# Patient Record
Sex: MALE | Race: WHITE | NOT HISPANIC OR LATINO | Employment: OTHER | URBAN - METROPOLITAN AREA
[De-identification: names, ages, dates, MRNs, and addresses within clinical notes are randomized per-mention and may not be internally consistent; named-entity substitution may affect disease eponyms.]

---

## 2018-03-27 ENCOUNTER — HOSPITAL ENCOUNTER (OUTPATIENT)
Facility: MEDICAL CENTER | Age: 68
End: 2018-03-27
Attending: NEUROLOGICAL SURGERY | Admitting: NEUROLOGICAL SURGERY
Payer: MEDICARE

## 2018-03-29 ENCOUNTER — HOSPITAL ENCOUNTER (OUTPATIENT)
Dept: CARDIOLOGY | Facility: MEDICAL CENTER | Age: 68
End: 2018-03-29
Attending: OPHTHALMOLOGY
Payer: MEDICARE

## 2018-03-29 DIAGNOSIS — H34.11 CENTRAL RETINAL ARTERY OCCLUSION OF RIGHT EYE: ICD-10-CM

## 2018-03-29 LAB
LV EJECT FRACT  99904: 75
LV EJECT FRACT MOD 2C 99903: 77.92
LV EJECT FRACT MOD 4C 99902: 75.36
LV EJECT FRACT MOD BP 99901: 77.08

## 2018-03-29 PROCEDURE — 93306 TTE W/DOPPLER COMPLETE: CPT

## 2018-04-03 ENCOUNTER — HOSPITAL ENCOUNTER (OUTPATIENT)
Dept: RADIOLOGY | Facility: MEDICAL CENTER | Age: 68
End: 2018-04-03
Attending: OPHTHALMOLOGY
Payer: MEDICARE

## 2018-04-03 ENCOUNTER — APPOINTMENT (OUTPATIENT)
Dept: ADMISSIONS | Facility: MEDICAL CENTER | Age: 68
End: 2018-04-03
Payer: MEDICARE

## 2018-04-03 DIAGNOSIS — H34.10 CENTRAL RETINAL ARTERY OCCLUSION, UNSPECIFIED LATERALITY: ICD-10-CM

## 2018-04-03 PROCEDURE — 93880 EXTRACRANIAL BILAT STUDY: CPT

## 2018-04-20 ENCOUNTER — OFFICE VISIT (OUTPATIENT)
Dept: CARDIOLOGY | Facility: MEDICAL CENTER | Age: 68
End: 2018-04-20
Payer: MEDICARE

## 2018-04-20 VITALS
OXYGEN SATURATION: 95 % | HEIGHT: 71 IN | DIASTOLIC BLOOD PRESSURE: 80 MMHG | HEART RATE: 100 BPM | SYSTOLIC BLOOD PRESSURE: 154 MMHG | BODY MASS INDEX: 22.68 KG/M2 | WEIGHT: 162 LBS

## 2018-04-20 DIAGNOSIS — Z95.5 HISTORY OF HEART ARTERY STENT: ICD-10-CM

## 2018-04-20 DIAGNOSIS — I10 HTN (HYPERTENSION), MALIGNANT: ICD-10-CM

## 2018-04-20 DIAGNOSIS — E78.5 DYSLIPIDEMIA: ICD-10-CM

## 2018-04-20 DIAGNOSIS — I77.9 RIGHT-SIDED CAROTID ARTERY DISEASE (HCC): ICD-10-CM

## 2018-04-20 DIAGNOSIS — Z01.810 PREOP CARDIOVASCULAR EXAM: ICD-10-CM

## 2018-04-20 DIAGNOSIS — I25.10 CORONARY ARTERY DISEASE INVOLVING NATIVE CORONARY ARTERY OF NATIVE HEART WITHOUT ANGINA PECTORIS: ICD-10-CM

## 2018-04-20 LAB — EKG IMPRESSION: NORMAL

## 2018-04-20 PROCEDURE — 99203 OFFICE O/P NEW LOW 30 MIN: CPT | Performed by: INTERNAL MEDICINE

## 2018-04-20 PROCEDURE — 93000 ELECTROCARDIOGRAM COMPLETE: CPT | Performed by: INTERNAL MEDICINE

## 2018-04-20 RX ORDER — METOPROLOL SUCCINATE 25 MG/1
50 TABLET, EXTENDED RELEASE ORAL DAILY
COMMUNITY
End: 2018-10-23

## 2018-04-20 ASSESSMENT — ENCOUNTER SYMPTOMS
DOUBLE VISION: 0
COUGH: 0
DEPRESSION: 0
DIZZINESS: 0
LOSS OF CONSCIOUSNESS: 0
FEVER: 0
SENSORY CHANGE: 0
BLOOD IN STOOL: 0
HEADACHES: 0
PND: 0
WEIGHT LOSS: 0
HALLUCINATIONS: 0
FALLS: 0
CLAUDICATION: 0
ORTHOPNEA: 0
SHORTNESS OF BREATH: 0
EYE DISCHARGE: 0
BLURRED VISION: 0
EYE PAIN: 0
VOMITING: 0
SPEECH CHANGE: 0
ABDOMINAL PAIN: 0
NAUSEA: 0
MYALGIAS: 0
BRUISES/BLEEDS EASILY: 0
CHILLS: 0
PALPITATIONS: 0

## 2018-04-20 NOTE — PROGRESS NOTES
Chief Complaint   Patient presents with   • Medical Clearance     NEW PATIENT       Subjective:   Tuan Bernard is a 68 y.o. male who presents today for cardiac care and preop evaluation before his urgent back surgery. Patient's legs are about to give out on him. He has a lot of tingling and numbness sensation in his legs. He does have history of coronary arterial disease for which he underwent PCI and stent placement in his proximal and mid LAD in 2004. He has been doing well since then. No repeated recurrent episodes of heart attacks.    He is being worked up for carotid disease with vascular surgeon.    No chest pain or shortness of breath.    I have reviewed patient's ECG, which shows normal sinus rhythm, normal [place], QT intervals. No evidence of acute coronary syndrome.    Past Medical History:   Diagnosis Date   • CAD (coronary artery disease)    • Heart attack    • Hyperlipidemia    • Hypertension    • PVD (peripheral vascular disease) (CMS-[facility])      Past Surgical History:   Procedure Laterality Date   • ANGIOPLASTY     • CARDIAC CATH       Family History   Problem Relation Age of Onset   • Heart Disease Mother      Social History     Social History   • Marital status:      Spouse name: N/A   • Number of children: N/A   • Years of education: N/A     Occupational History   • Not on file.     Social History Main Topics   • Smoking status: Never Smoker   • Smokeless tobacco: Never Used   • Alcohol use 1.2 oz/week     2 Glasses of wine per week      Comment: occasionally   • Drug use: No   • Sexual activity: Not on file     Other Topics Concern   • Not on file     Social History Narrative   • No narrative on file     No Known Allergies  Outpatient Encounter Prescriptions as of 4/20/2018   Medication Sig Dispense Refill   • metoprolol SR (TOPROL XL) 25 MG TABLET SR 24 HR Take 25 mg by mouth every day.     • aspirin 81 MG tablet Take 81 mg by mouth every day.     • lisinopril (PRINIVIL) 20 MG TABS Take  "20 mg by mouth every day.     • clopidogrel (PLAVIX) 75 MG TABS Take 75 mg by mouth every day.       No facility-administered encounter medications on file as of 4/20/2018.      Review of Systems   Constitutional: Negative for chills, fever, malaise/fatigue and weight loss.   HENT: Negative for ear discharge, ear pain, hearing loss and nosebleeds.    Eyes: Negative for blurred vision, double vision, pain and discharge.   Respiratory: Negative for cough and shortness of breath.    Cardiovascular: Negative for chest pain, palpitations, orthopnea, claudication, leg swelling and PND.   Gastrointestinal: Negative for abdominal pain, blood in stool, melena, nausea and vomiting.   Genitourinary: Negative for dysuria and hematuria.   Musculoskeletal: Negative for falls, joint pain and myalgias.   Skin: Negative for itching and rash.   Neurological: Negative for dizziness, sensory change, speech change, loss of consciousness and headaches.   Endo/Heme/Allergies: Negative for environmental allergies. Does not bruise/bleed easily.   Psychiatric/Behavioral: Negative for depression, hallucinations and suicidal ideas.        Objective:   /80   Pulse 100   Ht 1.803 m (5' 11\")   Wt 73.5 kg (162 lb)   SpO2 95%   BMI 22.59 kg/m²     Physical Exam   Constitutional: He is oriented to person, place, and time. No distress.   HENT:   Head: Normocephalic and atraumatic.   Right Ear: External ear normal.   Left Ear: External ear normal.   Eyes: Right eye exhibits no discharge. Left eye exhibits no discharge.   Neck: No JVD present. No thyromegaly present.   Cardiovascular: Normal rate, regular rhythm, normal heart sounds and intact distal pulses.  Exam reveals no gallop and no friction rub.    No murmur heard.  Pulmonary/Chest: Breath sounds normal. No respiratory distress.   Abdominal: Bowel sounds are normal. He exhibits no distension. There is no tenderness.   Musculoskeletal: He exhibits no edema or tenderness. "   Neurological: He is alert and oriented to person, place, and time. No cranial nerve deficit.   Skin: Skin is warm and dry. He is not diaphoretic.   Psychiatric: He has a normal mood and affect. His behavior is normal.   Nursing note and vitals reviewed.      Assessment:     1. History of heart artery stent prx/mid LAD with NADIRA in 2004  EKG    COMP METABOLIC PANEL    LIPID PANEL   2. HTN (hypertension), malignant     3. Dyslipidemia  COMP METABOLIC PANEL    LIPID PANEL   4. Coronary artery disease involving native coronary artery of native heart without angina pectoris     5. Right-sided carotid artery disease (CMS-HCC)     6. Preop cardiovascular exam         Medical Decision Making:  Today's Assessment / Status / Plan:   Patient is not in acute coronary syndrome presentation, is not having any active TIA or stroke symptoms, as not having decompensated heart failure, is not symptomatic in terms of presyncope pain or syncope. No evidence of significant valvular disease.    I explained to patient that further cardiac testing or procedures will not lower his overall cardiovascular risk for the surgery.  Patient remains low to moderate cardiovascular risk for her intended surgery of back.

## 2018-04-20 NOTE — LETTER
April 20, 2018        Tuan Bernard was seen in my cardiology clinic today.  Patient will have back surgery soon.   Patient is not in acute coronary syndrome presentation, is not having any active TIA or stroke symptoms, as not having decompensated heart failure, is not symptomatic in terms of presyncope pain or syncope. No evidence of significant valvular disease.    I explained to patient that further cardiac testing or procedures will not lower his overall cardiovascular risk for the surgery.  Patient remains low to moderate cardiovascular risk for her intended surgery of back.        Ted West MD.

## 2018-04-20 NOTE — LETTER
[facility]   [address], [facility] [address]  [place] [address]  Phone: [phone]  Fax: [phone]              Tuan Bernard  1950    Encounter Date: 4/20/2018    Ted West M.D.          PROGRESS NOTE:  Chief Complaint   Patient presents with   • Medical Clearance     NEW PATIENT       Subjective:   Tuan Bernard is a 68 y.o. male who presents today for cardiac care and preop evaluation before his urgent back surgery. Patient's legs are about to give out on him. He has a lot of tingling and numbness sensation in his legs. He does have history of coronary arterial disease for which he underwent PCI and stent placement in his proximal and mid LAD in 2004. He has been doing well since then. No repeated recurrent episodes of heart attacks.    He is being worked up for carotid disease with vascular surgeon.    No chest pain or shortness of breath.    I have reviewed patient's ECG, which shows normal sinus rhythm, normal [place], QT intervals. No evidence of acute coronary syndrome.    Past Medical History:   Diagnosis Date   • CAD (coronary artery disease)    • Heart attack    • Hyperlipidemia    • Hypertension    • PVD (peripheral vascular disease) (CMS-[facility])      Past Surgical History:   Procedure Laterality Date   • ANGIOPLASTY     • CARDIAC CATH       Family History   Problem Relation Age of Onset   • Heart Disease Mother      Social History     Social History   • Marital status:      Spouse name: N/A   • Number of children: N/A   • Years of education: N/A     Occupational History   • Not on file.     Social History Main Topics   • Smoking status: Never Smoker   • Smokeless tobacco: Never Used   • Alcohol use 1.2 oz/week     2 Glasses of wine per week      Comment: occasionally   • Drug use: No   • Sexual activity: Not on file     Other Topics Concern   • Not on file     Social History Narrative   • No narrative on file     No Known  "Allergies  Outpatient Encounter Prescriptions as of 4/20/2018   Medication Sig Dispense Refill   • metoprolol SR (TOPROL XL) 25 MG TABLET SR 24 HR Take 25 mg by mouth every day.     • aspirin 81 MG tablet Take 81 mg by mouth every day.     • lisinopril (PRINIVIL) 20 MG TABS Take 20 mg by mouth every day.     • clopidogrel (PLAVIX) 75 MG TABS Take 75 mg by mouth every day.       No facility-administered encounter medications on file as of 4/20/2018.      Review of Systems   Constitutional: Negative for chills, fever, malaise/fatigue and weight loss.   HENT: Negative for ear discharge, ear pain, hearing loss and nosebleeds.    Eyes: Negative for blurred vision, double vision, pain and discharge.   Respiratory: Negative for cough and shortness of breath.    Cardiovascular: Negative for chest pain, palpitations, orthopnea, claudication, leg swelling and PND.   Gastrointestinal: Negative for abdominal pain, blood in stool, melena, nausea and vomiting.   Genitourinary: Negative for dysuria and hematuria.   Musculoskeletal: Negative for falls, joint pain and myalgias.   Skin: Negative for itching and rash.   Neurological: Negative for dizziness, sensory change, speech change, loss of consciousness and headaches.   Endo/Heme/Allergies: Negative for environmental allergies. Does not bruise/bleed easily.   Psychiatric/Behavioral: Negative for depression, hallucinations and suicidal ideas.        Objective:   /80   Pulse 100   Ht 1.803 m (5' 11\")   Wt 73.5 kg (162 lb)   SpO2 95%   BMI 22.59 kg/m²      Physical Exam   Constitutional: He is oriented to person, place, and time. No distress.   HENT:   Head: Normocephalic and atraumatic.   Right Ear: External ear normal.   Left Ear: External ear normal.   Eyes: Right eye exhibits no discharge. Left eye exhibits no discharge.   Neck: No JVD present. No thyromegaly present.   Cardiovascular: Normal rate, regular rhythm, normal heart sounds and intact distal pulses.  Exam " reveals no gallop and no friction rub.    No murmur heard.  Pulmonary/Chest: Breath sounds normal. No respiratory distress.   Abdominal: Bowel sounds are normal. He exhibits no distension. There is no tenderness.   Musculoskeletal: He exhibits no edema or tenderness.   Neurological: He is alert and oriented to person, place, and time. No cranial nerve deficit.   Skin: Skin is warm and dry. He is not diaphoretic.   Psychiatric: He has a normal mood and affect. His behavior is normal.   Nursing note and vitals reviewed.      Assessment:     1. History of heart artery stent prx/mid LAD with NADIRA in 2004  EKG    COMP METABOLIC PANEL    LIPID PANEL   2. HTN (hypertension), malignant     3. Dyslipidemia  COMP METABOLIC PANEL    LIPID PANEL   4. Coronary artery disease involving native coronary artery of native heart without angina pectoris     5. Right-sided carotid artery disease (CMS-HCC)     6. Preop cardiovascular exam         Medical Decision Making:  Today's Assessment / Status / Plan:   Patient is not in acute coronary syndrome presentation, is not having any active TIA or stroke symptoms, as not having decompensated heart failure, is not symptomatic in terms of presyncope pain or syncope. No evidence of significant valvular disease.    I explained to patient that further cardiac testing or procedures will not lower his overall cardiovascular risk for the surgery.  Patient remains low to moderate cardiovascular risk for her intended surgery of back.        Sebastian Marion M.D.  [address] [place] [address]  VIA Facsimile: [phone]

## 2018-05-08 ENCOUNTER — HOSPITAL ENCOUNTER (OUTPATIENT)
Dept: RADIOLOGY | Facility: MEDICAL CENTER | Age: 68
End: 2018-05-08
Attending: SURGERY
Payer: MEDICARE

## 2018-05-08 DIAGNOSIS — I65.23 BILATERAL CAROTID ARTERY STENOSIS: ICD-10-CM

## 2018-05-08 PROCEDURE — 700117 HCHG RX CONTRAST REV CODE 255: Performed by: SURGERY

## 2018-05-08 PROCEDURE — 70496 CT ANGIOGRAPHY HEAD: CPT

## 2018-05-08 PROCEDURE — 70498 CT ANGIOGRAPHY NECK: CPT

## 2018-05-08 RX ADMIN — IOHEXOL 100 ML: 350 INJECTION, SOLUTION INTRAVENOUS at 15:43

## 2018-05-22 ENCOUNTER — APPOINTMENT (OUTPATIENT)
Dept: ADMISSIONS | Facility: MEDICAL CENTER | Age: 68
End: 2018-05-22
Attending: NEUROLOGICAL SURGERY
Payer: MEDICARE

## 2018-05-22 RX ORDER — CHLORAL HYDRATE 500 MG
1000 CAPSULE ORAL DAILY
Status: ON HOLD | COMMUNITY
End: 2018-05-24

## 2018-05-22 RX ORDER — ATORVASTATIN CALCIUM 10 MG/1
20 TABLET, FILM COATED ORAL DAILY
COMMUNITY
End: 2018-10-23

## 2018-05-22 RX ORDER — LISINOPRIL 40 MG/1
40 TABLET ORAL EVERY MORNING
COMMUNITY
End: 2019-04-23 | Stop reason: SDUPTHER

## 2018-05-23 ENCOUNTER — APPOINTMENT (OUTPATIENT)
Dept: RADIOLOGY | Facility: MEDICAL CENTER | Age: 68
End: 2018-05-23
Attending: NEUROLOGICAL SURGERY
Payer: MEDICARE

## 2018-05-23 ENCOUNTER — HOSPITAL ENCOUNTER (OUTPATIENT)
Facility: MEDICAL CENTER | Age: 68
End: 2018-05-24
Attending: NEUROLOGICAL SURGERY | Admitting: NEUROLOGICAL SURGERY
Payer: MEDICARE

## 2018-05-23 DIAGNOSIS — G89.18 POST-OPERATIVE PAIN: ICD-10-CM

## 2018-05-23 PROCEDURE — 160002 HCHG RECOVERY MINUTES (STAT): Performed by: NEUROLOGICAL SURGERY

## 2018-05-23 PROCEDURE — G0378 HOSPITAL OBSERVATION PER HR: HCPCS

## 2018-05-23 PROCEDURE — A9270 NON-COVERED ITEM OR SERVICE: HCPCS

## 2018-05-23 PROCEDURE — 160048 HCHG OR STATISTICAL LEVEL 1-5: Performed by: NEUROLOGICAL SURGERY

## 2018-05-23 PROCEDURE — 160029 HCHG SURGERY MINUTES - 1ST 30 MINS LEVEL 4: Performed by: NEUROLOGICAL SURGERY

## 2018-05-23 PROCEDURE — 700111 HCHG RX REV CODE 636 W/ 250 OVERRIDE (IP)

## 2018-05-23 PROCEDURE — 160036 HCHG PACU - EA ADDL 30 MINS PHASE I: Performed by: NEUROLOGICAL SURGERY

## 2018-05-23 PROCEDURE — 700102 HCHG RX REV CODE 250 W/ 637 OVERRIDE(OP)

## 2018-05-23 PROCEDURE — 501838 HCHG SUTURE GENERAL: Performed by: NEUROLOGICAL SURGERY

## 2018-05-23 PROCEDURE — 700101 HCHG RX REV CODE 250

## 2018-05-23 PROCEDURE — 500367 HCHG DRAIN KIT, HEMOVAC: Performed by: NEUROLOGICAL SURGERY

## 2018-05-23 PROCEDURE — 700111 HCHG RX REV CODE 636 W/ 250 OVERRIDE (IP): Performed by: PHYSICIAN ASSISTANT

## 2018-05-23 PROCEDURE — 503195 HCHG SEALER, BIPOLAR AQUAMANTYS: Performed by: NEUROLOGICAL SURGERY

## 2018-05-23 PROCEDURE — 160035 HCHG PACU - 1ST 60 MINS PHASE I: Performed by: NEUROLOGICAL SURGERY

## 2018-05-23 PROCEDURE — 110454 HCHG SHELL REV 250: Performed by: NEUROLOGICAL SURGERY

## 2018-05-23 PROCEDURE — 700101 HCHG RX REV CODE 250: Performed by: PHYSICIAN ASSISTANT

## 2018-05-23 PROCEDURE — 500002 HCHG ADHESIVE, DERMABOND: Performed by: NEUROLOGICAL SURGERY

## 2018-05-23 PROCEDURE — 160009 HCHG ANES TIME/MIN: Performed by: NEUROLOGICAL SURGERY

## 2018-05-23 PROCEDURE — 160041 HCHG SURGERY MINUTES - EA ADDL 1 MIN LEVEL 4: Performed by: NEUROLOGICAL SURGERY

## 2018-05-23 PROCEDURE — 700102 HCHG RX REV CODE 250 W/ 637 OVERRIDE(OP): Performed by: PHYSICIAN ASSISTANT

## 2018-05-23 PROCEDURE — A9270 NON-COVERED ITEM OR SERVICE: HCPCS | Performed by: PHYSICIAN ASSISTANT

## 2018-05-23 PROCEDURE — 96374 THER/PROPH/DIAG INJ IV PUSH: CPT

## 2018-05-23 RX ORDER — ATORVASTATIN CALCIUM 10 MG/1
10 TABLET, FILM COATED ORAL
Status: DISCONTINUED | OUTPATIENT
Start: 2018-05-23 | End: 2018-05-24 | Stop reason: HOSPADM

## 2018-05-23 RX ORDER — ENEMA 19; 7 G/133ML; G/133ML
1 ENEMA RECTAL
Status: DISCONTINUED | OUTPATIENT
Start: 2018-05-23 | End: 2018-05-24 | Stop reason: HOSPADM

## 2018-05-23 RX ORDER — VANCOMYCIN HCL 900 MCG/MG
POWDER (GRAM) MISCELLANEOUS
Status: DISCONTINUED | OUTPATIENT
Start: 2018-05-23 | End: 2018-05-23 | Stop reason: HOSPADM

## 2018-05-23 RX ORDER — LIDOCAINE HYDROCHLORIDE 10 MG/ML
0.5 INJECTION, SOLUTION INFILTRATION; PERINEURAL
Status: ACTIVE | OUTPATIENT
Start: 2018-05-23 | End: 2018-05-24

## 2018-05-23 RX ORDER — METOPROLOL SUCCINATE 25 MG/1
25 TABLET, EXTENDED RELEASE ORAL DAILY
Status: DISCONTINUED | OUTPATIENT
Start: 2018-05-24 | End: 2018-05-24 | Stop reason: HOSPADM

## 2018-05-23 RX ORDER — LABETALOL HYDROCHLORIDE 5 MG/ML
10 INJECTION, SOLUTION INTRAVENOUS
Status: DISCONTINUED | OUTPATIENT
Start: 2018-05-23 | End: 2018-05-24 | Stop reason: HOSPADM

## 2018-05-23 RX ORDER — MAGNESIUM HYDROXIDE 1200 MG/15ML
LIQUID ORAL
Status: COMPLETED | OUTPATIENT
Start: 2018-05-23 | End: 2018-05-23

## 2018-05-23 RX ORDER — AMOXICILLIN 250 MG
1 CAPSULE ORAL
Status: DISCONTINUED | OUTPATIENT
Start: 2018-05-23 | End: 2018-05-24 | Stop reason: HOSPADM

## 2018-05-23 RX ORDER — POLYETHYLENE GLYCOL 3350 17 G/17G
1 POWDER, FOR SOLUTION ORAL 2 TIMES DAILY PRN
Status: DISCONTINUED | OUTPATIENT
Start: 2018-05-23 | End: 2018-05-24 | Stop reason: HOSPADM

## 2018-05-23 RX ORDER — MORPHINE SULFATE 4 MG/ML
2 INJECTION, SOLUTION INTRAMUSCULAR; INTRAVENOUS
Status: DISCONTINUED | OUTPATIENT
Start: 2018-05-23 | End: 2018-05-24 | Stop reason: HOSPADM

## 2018-05-23 RX ORDER — BUPIVACAINE HYDROCHLORIDE AND EPINEPHRINE 5; 5 MG/ML; UG/ML
INJECTION, SOLUTION EPIDURAL; INTRACAUDAL; PERINEURAL
Status: DISCONTINUED | OUTPATIENT
Start: 2018-05-23 | End: 2018-05-23 | Stop reason: HOSPADM

## 2018-05-23 RX ORDER — AMOXICILLIN 250 MG
1 CAPSULE ORAL NIGHTLY
Status: DISCONTINUED | OUTPATIENT
Start: 2018-05-23 | End: 2018-05-24 | Stop reason: HOSPADM

## 2018-05-23 RX ORDER — DIPHENHYDRAMINE HYDROCHLORIDE 50 MG/ML
25 INJECTION INTRAMUSCULAR; INTRAVENOUS EVERY 6 HOURS PRN
Status: DISCONTINUED | OUTPATIENT
Start: 2018-05-23 | End: 2018-05-24 | Stop reason: HOSPADM

## 2018-05-23 RX ORDER — ALPRAZOLAM 0.25 MG/1
0.25 TABLET ORAL 2 TIMES DAILY PRN
Status: DISCONTINUED | OUTPATIENT
Start: 2018-05-23 | End: 2018-05-24 | Stop reason: HOSPADM

## 2018-05-23 RX ORDER — OXYCODONE HCL 5 MG/5 ML
SOLUTION, ORAL ORAL
Status: COMPLETED
Start: 2018-05-23 | End: 2018-05-23

## 2018-05-23 RX ORDER — BISACODYL 10 MG
10 SUPPOSITORY, RECTAL RECTAL
Status: DISCONTINUED | OUTPATIENT
Start: 2018-05-23 | End: 2018-05-24 | Stop reason: HOSPADM

## 2018-05-23 RX ORDER — HYDROCODONE BITARTRATE AND ACETAMINOPHEN 5; 325 MG/1; MG/1
1-2 TABLET ORAL EVERY 4 HOURS PRN
Status: DISCONTINUED | OUTPATIENT
Start: 2018-05-23 | End: 2018-05-24 | Stop reason: HOSPADM

## 2018-05-23 RX ORDER — ONDANSETRON 4 MG/1
4 TABLET, ORALLY DISINTEGRATING ORAL EVERY 4 HOURS PRN
Status: DISCONTINUED | OUTPATIENT
Start: 2018-05-23 | End: 2018-05-24 | Stop reason: HOSPADM

## 2018-05-23 RX ORDER — DOCUSATE SODIUM 100 MG/1
100 CAPSULE, LIQUID FILLED ORAL 2 TIMES DAILY
Status: DISCONTINUED | OUTPATIENT
Start: 2018-05-23 | End: 2018-05-24 | Stop reason: HOSPADM

## 2018-05-23 RX ORDER — CEFAZOLIN SODIUM 2 G/100ML
2 INJECTION, SOLUTION INTRAVENOUS EVERY 8 HOURS
Status: COMPLETED | OUTPATIENT
Start: 2018-05-23 | End: 2018-05-24

## 2018-05-23 RX ORDER — METHOCARBAMOL 750 MG/1
750 TABLET, FILM COATED ORAL EVERY 8 HOURS PRN
Status: DISCONTINUED | OUTPATIENT
Start: 2018-05-23 | End: 2018-05-24 | Stop reason: HOSPADM

## 2018-05-23 RX ORDER — OXYCODONE HYDROCHLORIDE AND ACETAMINOPHEN 5; 325 MG/1; MG/1
1-2 TABLET ORAL EVERY 4 HOURS PRN
Status: DISCONTINUED | OUTPATIENT
Start: 2018-05-23 | End: 2018-05-24 | Stop reason: HOSPADM

## 2018-05-23 RX ORDER — ONDANSETRON 2 MG/ML
4 INJECTION INTRAMUSCULAR; INTRAVENOUS EVERY 4 HOURS PRN
Status: DISCONTINUED | OUTPATIENT
Start: 2018-05-23 | End: 2018-05-24 | Stop reason: HOSPADM

## 2018-05-23 RX ORDER — LISINOPRIL 20 MG/1
40 TABLET ORAL EVERY MORNING
Status: DISCONTINUED | OUTPATIENT
Start: 2018-05-24 | End: 2018-05-24 | Stop reason: HOSPADM

## 2018-05-23 RX ORDER — SODIUM CHLORIDE AND POTASSIUM CHLORIDE 150; 900 MG/100ML; MG/100ML
INJECTION, SOLUTION INTRAVENOUS CONTINUOUS
Status: DISCONTINUED | OUTPATIENT
Start: 2018-05-23 | End: 2018-05-24 | Stop reason: HOSPADM

## 2018-05-23 RX ORDER — DIPHENHYDRAMINE HCL 25 MG
25 TABLET ORAL EVERY 6 HOURS PRN
Status: DISCONTINUED | OUTPATIENT
Start: 2018-05-23 | End: 2018-05-24 | Stop reason: HOSPADM

## 2018-05-23 RX ORDER — BACITRACIN 50000 [IU]/1
INJECTION, POWDER, FOR SOLUTION INTRAMUSCULAR
Status: DISCONTINUED | OUTPATIENT
Start: 2018-05-23 | End: 2018-05-23 | Stop reason: HOSPADM

## 2018-05-23 RX ORDER — SODIUM CHLORIDE, SODIUM LACTATE, POTASSIUM CHLORIDE, CALCIUM CHLORIDE 600; 310; 30; 20 MG/100ML; MG/100ML; MG/100ML; MG/100ML
INJECTION, SOLUTION INTRAVENOUS CONTINUOUS
Status: DISCONTINUED | OUTPATIENT
Start: 2018-05-23 | End: 2018-05-24 | Stop reason: HOSPADM

## 2018-05-23 RX ADMIN — BENZOCAINE AND MENTHOL 1 LOZENGE: 15; 3.6 LOZENGE ORAL at 22:03

## 2018-05-23 RX ADMIN — OXYCODONE HYDROCHLORIDE AND ACETAMINOPHEN 2 TABLET: 5; 325 TABLET ORAL at 22:18

## 2018-05-23 RX ADMIN — POTASSIUM CHLORIDE AND SODIUM CHLORIDE: 900; 150 INJECTION, SOLUTION INTRAVENOUS at 21:04

## 2018-05-23 RX ADMIN — OXYCODONE HYDROCHLORIDE 5 MG: 5 SOLUTION ORAL at 19:00

## 2018-05-23 RX ADMIN — METHOCARBAMOL 750 MG: 750 TABLET ORAL at 22:18

## 2018-05-23 RX ADMIN — CEFAZOLIN SODIUM 2 G: 2 INJECTION, SOLUTION INTRAVENOUS at 22:17

## 2018-05-23 RX ADMIN — SODIUM CHLORIDE, SODIUM LACTATE, POTASSIUM CHLORIDE, CALCIUM CHLORIDE: 600; 310; 30; 20 INJECTION, SOLUTION INTRAVENOUS at 11:31

## 2018-05-23 RX ADMIN — ATORVASTATIN CALCIUM 10 MG: 10 TABLET, FILM COATED ORAL at 22:01

## 2018-05-23 ASSESSMENT — COGNITIVE AND FUNCTIONAL STATUS - GENERAL
MOBILITY SCORE: 17
STANDING UP FROM CHAIR USING ARMS: A LITTLE
SUGGESTED CMS G CODE MODIFIER MOBILITY: CK
MOVING FROM LYING ON BACK TO SITTING ON SIDE OF FLAT BED: A LITTLE
TURNING FROM BACK TO SIDE WHILE IN FLAT BAD: A LITTLE
DRESSING REGULAR UPPER BODY CLOTHING: A LITTLE
WALKING IN HOSPITAL ROOM: A LITTLE
HELP NEEDED FOR BATHING: A LITTLE
DRESSING REGULAR LOWER BODY CLOTHING: A LITTLE
DAILY ACTIVITIY SCORE: 20
SUGGESTED CMS G CODE MODIFIER DAILY ACTIVITY: CJ
TOILETING: A LITTLE
MOVING TO AND FROM BED TO CHAIR: A LITTLE
CLIMB 3 TO 5 STEPS WITH RAILING: A LOT

## 2018-05-23 ASSESSMENT — PAIN SCALES - GENERAL
PAINLEVEL_OUTOF10: 0
PAINLEVEL_OUTOF10: 3
PAINLEVEL_OUTOF10: 5
PAINLEVEL_OUTOF10: 0
PAINLEVEL_OUTOF10: 3
PAINLEVEL_OUTOF10: 0
PAINLEVEL_OUTOF10: 3
PAINLEVEL_OUTOF10: 7

## 2018-05-23 ASSESSMENT — LIFESTYLE VARIABLES
TOTAL SCORE: 0
EVER HAD A DRINK FIRST THING IN THE MORNING TO STEADY YOUR NERVES TO GET RID OF A HANGOVER: NO
CONSUMPTION TOTAL: NEGATIVE
TOTAL SCORE: 0
ON A TYPICAL DAY WHEN YOU DRINK ALCOHOL HOW MANY DRINKS DO YOU HAVE: 1
TOTAL SCORE: 0
EVER FELT BAD OR GUILTY ABOUT YOUR DRINKING: NO
AVERAGE NUMBER OF DAYS PER WEEK YOU HAVE A DRINK CONTAINING ALCOHOL: 5
ALCOHOL_USE: YES
HAVE YOU EVER FELT YOU SHOULD CUT DOWN ON YOUR DRINKING: NO
HOW MANY TIMES IN THE PAST YEAR HAVE YOU HAD 5 OR MORE DRINKS IN A DAY: 0
HAVE PEOPLE ANNOYED YOU BY CRITICIZING YOUR DRINKING: NO

## 2018-05-23 ASSESSMENT — PATIENT HEALTH QUESTIONNAIRE - PHQ9
SUM OF ALL RESPONSES TO PHQ9 QUESTIONS 1 AND 2: 0
1. LITTLE INTEREST OR PLEASURE IN DOING THINGS: NOT AT ALL
2. FEELING DOWN, DEPRESSED, IRRITABLE, OR HOPELESS: NOT AT ALL

## 2018-05-24 VITALS
DIASTOLIC BLOOD PRESSURE: 74 MMHG | BODY MASS INDEX: 26.11 KG/M2 | SYSTOLIC BLOOD PRESSURE: 146 MMHG | OXYGEN SATURATION: 92 % | RESPIRATION RATE: 14 BRPM | HEIGHT: 71 IN | WEIGHT: 186.51 LBS | TEMPERATURE: 99.4 F | HEART RATE: 94 BPM

## 2018-05-24 LAB
ANION GAP SERPL CALC-SCNC: 6 MMOL/L (ref 0–11.9)
BUN SERPL-MCNC: 19 MG/DL (ref 8–22)
CALCIUM SERPL-MCNC: 8.3 MG/DL (ref 8.5–10.5)
CHLORIDE SERPL-SCNC: 107 MMOL/L (ref 96–112)
CO2 SERPL-SCNC: 23 MMOL/L (ref 20–33)
CREAT SERPL-MCNC: 1.11 MG/DL (ref 0.5–1.4)
ERYTHROCYTE [DISTWIDTH] IN BLOOD BY AUTOMATED COUNT: 43.8 FL (ref 35.9–50)
GLUCOSE SERPL-MCNC: 107 MG/DL (ref 65–99)
HCT VFR BLD AUTO: 34.7 % (ref 42–52)
HGB BLD-MCNC: 11.4 G/DL (ref 14–18)
MCH RBC QN AUTO: 29.6 PG (ref 27–33)
MCHC RBC AUTO-ENTMCNC: 32.9 G/DL (ref 33.7–35.3)
MCV RBC AUTO: 90.1 FL (ref 81.4–97.8)
PLATELET # BLD AUTO: 185 K/UL (ref 164–446)
PMV BLD AUTO: 9.5 FL (ref 9–12.9)
POTASSIUM SERPL-SCNC: 3.9 MMOL/L (ref 3.6–5.5)
RBC # BLD AUTO: 3.85 M/UL (ref 4.7–6.1)
SODIUM SERPL-SCNC: 136 MMOL/L (ref 135–145)
WBC # BLD AUTO: 8.1 K/UL (ref 4.8–10.8)

## 2018-05-24 PROCEDURE — 700102 HCHG RX REV CODE 250 W/ 637 OVERRIDE(OP): Performed by: PHYSICIAN ASSISTANT

## 2018-05-24 PROCEDURE — G8988 SELF CARE GOAL STATUS: HCPCS | Mod: CI

## 2018-05-24 PROCEDURE — 96375 TX/PRO/DX INJ NEW DRUG ADDON: CPT

## 2018-05-24 PROCEDURE — 36415 COLL VENOUS BLD VENIPUNCTURE: CPT

## 2018-05-24 PROCEDURE — G8987 SELF CARE CURRENT STATUS: HCPCS | Mod: CI

## 2018-05-24 PROCEDURE — 96376 TX/PRO/DX INJ SAME DRUG ADON: CPT

## 2018-05-24 PROCEDURE — 700111 HCHG RX REV CODE 636 W/ 250 OVERRIDE (IP): Performed by: PHYSICIAN ASSISTANT

## 2018-05-24 PROCEDURE — G8978 MOBILITY CURRENT STATUS: HCPCS | Mod: CK

## 2018-05-24 PROCEDURE — A9270 NON-COVERED ITEM OR SERVICE: HCPCS | Performed by: PHYSICIAN ASSISTANT

## 2018-05-24 PROCEDURE — 700112 HCHG RX REV CODE 229: Performed by: PHYSICIAN ASSISTANT

## 2018-05-24 PROCEDURE — 85027 COMPLETE CBC AUTOMATED: CPT

## 2018-05-24 PROCEDURE — G0378 HOSPITAL OBSERVATION PER HR: HCPCS

## 2018-05-24 PROCEDURE — 80048 BASIC METABOLIC PNL TOTAL CA: CPT

## 2018-05-24 PROCEDURE — 97165 OT EVAL LOW COMPLEX 30 MIN: CPT

## 2018-05-24 PROCEDURE — G8979 MOBILITY GOAL STATUS: HCPCS | Mod: CI

## 2018-05-24 PROCEDURE — 97162 PT EVAL MOD COMPLEX 30 MIN: CPT

## 2018-05-24 RX ORDER — METHOCARBAMOL 750 MG/1
750 TABLET, FILM COATED ORAL EVERY 8 HOURS PRN
Qty: 90 TAB | Refills: 0 | Status: SHIPPED | OUTPATIENT
Start: 2018-05-24 | End: 2018-10-23

## 2018-05-24 RX ORDER — OXYCODONE HYDROCHLORIDE AND ACETAMINOPHEN 5; 325 MG/1; MG/1
1-2 TABLET ORAL EVERY 4 HOURS PRN
Qty: 60 TAB | Refills: 0 | Status: SHIPPED | OUTPATIENT
Start: 2018-05-24 | End: 2018-06-01

## 2018-05-24 RX ORDER — CEPHALEXIN 500 MG/1
500 CAPSULE ORAL 4 TIMES DAILY
Qty: 20 CAP | Refills: 0 | Status: SHIPPED | OUTPATIENT
Start: 2018-05-24 | End: 2018-10-23

## 2018-05-24 RX ADMIN — ONDANSETRON HYDROCHLORIDE 4 MG: 2 INJECTION, SOLUTION INTRAMUSCULAR; INTRAVENOUS at 08:59

## 2018-05-24 RX ADMIN — OXYCODONE HYDROCHLORIDE AND ACETAMINOPHEN 2 TABLET: 5; 325 TABLET ORAL at 03:31

## 2018-05-24 RX ADMIN — DOCUSATE SODIUM 100 MG: 100 CAPSULE ORAL at 08:49

## 2018-05-24 RX ADMIN — OXYCODONE HYDROCHLORIDE AND ACETAMINOPHEN 2 TABLET: 5; 325 TABLET ORAL at 08:49

## 2018-05-24 RX ADMIN — VITAMIN D, TAB 1000IU (100/BT) 5000 UNITS: 25 TAB at 08:50

## 2018-05-24 RX ADMIN — LISINOPRIL 40 MG: 20 TABLET ORAL at 08:50

## 2018-05-24 RX ADMIN — CEFAZOLIN SODIUM 2 G: 2 INJECTION, SOLUTION INTRAVENOUS at 06:02

## 2018-05-24 ASSESSMENT — COGNITIVE AND FUNCTIONAL STATUS - GENERAL
DAILY ACTIVITIY SCORE: 19
CLIMB 3 TO 5 STEPS WITH RAILING: A LITTLE
TOILETING: A LITTLE
MOBILITY SCORE: 18
DRESSING REGULAR UPPER BODY CLOTHING: A LITTLE
TURNING FROM BACK TO SIDE WHILE IN FLAT BAD: UNABLE
SUGGESTED CMS G CODE MODIFIER DAILY ACTIVITY: CK
PERSONAL GROOMING: A LITTLE
SUGGESTED CMS G CODE MODIFIER MOBILITY: CK
WALKING IN HOSPITAL ROOM: A LITTLE
HELP NEEDED FOR BATHING: A LITTLE
DRESSING REGULAR LOWER BODY CLOTHING: A LITTLE
STANDING UP FROM CHAIR USING ARMS: A LITTLE

## 2018-05-24 ASSESSMENT — PAIN SCALES - GENERAL
PAINLEVEL_OUTOF10: 7
PAINLEVEL_OUTOF10: 6
PAINLEVEL_OUTOF10: 3

## 2018-05-24 ASSESSMENT — GAIT ASSESSMENTS
DEVIATION: STEP TO;DECREASED BASE OF SUPPORT
ASSISTIVE DEVICE: FRONT WHEEL WALKER
GAIT LEVEL OF ASSIST: STAND BY ASSIST
DISTANCE (FEET): 150

## 2018-05-24 ASSESSMENT — ACTIVITIES OF DAILY LIVING (ADL): TOILETING: INDEPENDENT

## 2018-05-24 NOTE — THERAPY
"Occupational Therapy Evaluation completed.   Functional Status:  CGA LB dressing, SPV UB dressing, SPV grooming standing- performed oral care, SBA functional mobility  Plan of Care: Will benefit from Occupational Therapy 3 times per week  Discharge Recommendations:  Equipment: Grab Bars. Post-acute therapy Recommend follow up therapy once at medically appropriate stage in recovery    See \"Rehab Therapy-Acute\" Patient Summary Report for complete documentation.      Pt is a 69 y/o male who presents to acute s/p lumbar laminectomy microdiskectomy of L5-S1. No brace per chart. Pt lives in a 1 story home w/ his spouse and adult daughter availavle for 24/7 SPV. Pt was walk in shower w/ shower chair. Ed/trained pt and family on how to perform BADLs w/ decreased spinal/back pain. Will follow for Acute OT services to increase independence w/ LB dressing and reinforce back safety techniques. Recommend DC home.   "

## 2018-05-24 NOTE — THERAPY
"Physical Therapy Evaluation completed.   Bed Mobility:  Supine to Sit:  (NT, in chair pre/post; discussed sleeping posture)  Transfers: Sit to Stand: Stand by Assist (with FWW)  Gait: Level Of Assist: Stand by Assist with Front-Wheel Walker       Plan of Care: Will benefit from Physical Therapy 3 times per week  Discharge Recommendations: Equipment: No Equipment Needed.     Pt presents with impaired activity tolerance, gait mechanics and right DF ROM/strength s/p L5-S1 lami. Pt is most limited by premorbid gait deficits with steppage gait on right with prior hx of foot drop; pt's right DF about 2+/5 with less great toe extension, discussed relation to pressure on back over time; provided beginning DF program to reduce fall risk. Pt will have 24/7 support from wife, recommend follow up outpt PT as soon as appropriate; will follow if remains in house.    See \"Rehab Therapy-Acute\" Patient Summary Report for complete documentation.     "

## 2018-05-24 NOTE — OP REPORT
DATE OF SERVICE:  05/23/2018    SURGEON:  Betty Haile MD.    ASSISTANT:  Angel Luis Thompson PA-C.    ANESTHESIOLOGIST:  Marielena Mendez MD.    TYPE OF ANESTHESIA:  General anesthesia with endotracheal intubation.    PREOPERATIVE DIAGNOSES:  1.  Status post L2-S1 decompressive laminectomy, 2013.  2.  Complete right foot drop.  3.  Right L5-S1 disk herniation with a superiorly migrated fragment.    POSTOPERATIVE DIAGNOSES:  1.  Status post L2-S1 decompressive laminectomy, 2013.  2.  Complete right foot drop.  3.  Right L5-S1 disk herniation with a superiorly migrated fragment.    INDICATIONS:  The patient is a somewhat complicated 68-year-old man.  He   underwent a decompressive laminectomy back in 2013.  He did well from this.    Everything resolved.  Three months before he same me, he had intermittent leg   symptoms and he developed increasing symptoms, particularly right-sided   buttock pain and right L5 pain.  He also developed a complete right footdrop.    Unfortunately, he could not have his surgery earlier because he had heart   issues and he persisted with pain down the right leg, it was in the L5-S1   distribution.  He had failed all conservative therapy and he was consequently   offered surgery.  Preoperatively, he had a well-healed lumbar incision.  There   was no movement in his right EHL and ankle dorsiflexion.  There was 4/5   weakness of the eversion.  There was a trace hamstrings weakness and some   numbness in the L5-S1 distribution.  MRI scan showed a stable   spondylolisthesis with a large right acute L5-S1 disk herniation with   superiorly migrated fragment.  I explained to him that it was unlikely that   the footdrop will recover, but we will decompress him to see if it would   improve his leg pain.  The risks, benefits, alternatives outlined in the   consultation note.    TITLE OF PROCEDURE:  1.  Redo right L5 hemilaminectomy.  2.  Redo right S1 hemilaminectomy.  3.  Right L5-S1 medial facetectomy.  4.   Right L5-S1 diskectomy.  5.  Transpedicular decompression of the exiting right L5 nerve root.  6.  Microscopic microdissection.  7.  O-arm navigation for bony landmarks.    OPERATIVE FINDINGS:  There was a large amount of scar tissue that was   affected.  There was some lateral recess stenosis.  There were disk fragments,   which we removed in a piecemeal fashion.  There was scar tissue around the L5   and S1 nerve roots.  He was well decompressed at the end.  There was a large   amount of scar tissue mixed with disks.  There were no complications.    ESTIMATED BLOOD LOSS:  50 mL    OPERATIVE DETAILS:  After fully informed consent, the patient was brought to   the operating room at [facility].  General anesthesia was   administered.  He was given intravenous antibiotic.  He was positioned prone   on the OSI operating table in the [place] frame.  All pressure points padded.    Posterior lumbar region prepped and draped in a standard fashion.  We placed a   left iliac spike.  The O-arm was brought in and used for navigation.  Using   the O-arm navigation, I made a 4 cm incision on the right side in the midline   of the L5-S1 interspace.  I dissected down to the deep fascia and then went to   the right side to expose the medial facet joints.  We found where the disk   was ____ superiorly migrated fragment.  I consequently exposed the facet joint   on the right side at L5-S1 and whatever was remaining in the hemilamina.    Self-retaining retractors were placed.  Microscope was brought in the field of   view.  Hemilaminectomy was affected with an AM-8 drill bit.  Once I thinned   this down, I started removing ligamentum flavum.  The exiting nerve root L5   was identified and decompressed in a transpedicular fashion.  I then went more   medially and scraped the scar tissue off the common thecal sac and exposed   the S1 nerve root.  With gentle medial retraction, multiple disk fragments   were removed.   The disk space was incised and removed in a piecemeal fashion.    There were no large single disk fragments.  Foraminotomy was affected.  He   was well decompressed by the end of the case.  At the end of the case, all the   nerve roots were free.  Hemostasis obtained.  The disk space was explored.    There were no loose fragments.  Foraminotomy was affected.  Closure was then   affected using multiple interrupted Vicryl sutures with staples to skin.    COMPLICATIONS:  Nil.    ESTIMATED BLOOD LOSS:  50 mL    I would like to see how the patient progresses.  He had a lot of scar tissue.    He has been well decompressed.  We will see how things progress.       ____________________________________     MD INGRIS SWAN / SHERIE    DD:  05/23/2018 18:05:37  DT:  05/23/2018 19:12:28    D#:  [id number]  Job#:  [id number]    cc: DHEERAJ RAMOS MD, Adam Landon MD

## 2018-05-24 NOTE — PROGRESS NOTES
Neurosurgery Progress Note    Subjective:  POD #1 seen w/ Dr. Haile  Pain controlled w/ orals  Leg symptoms improved  Some unsteadiness in gate  Would like to go home  Mobilizing in room  Voiding  Eating and Drinking   No N/V    Exam:  Wound: Intact. Minor drainage.   Trace distal weakness  Proximally intact  Labs stable  VSS    BP  Min: 125/69  Max: 165/84  Pulse  Av.3  Min: 68  Max: 77  Resp  Av.4  Min: 13  Max: 23  Temp  Av.8 °C (98.2 °F)  Min: 36.3 °C (97.4 °F)  Max: 37.1 °C (98.7 °F)  SpO2  Av.4 %  Min: 92 %  Max: 98 %    No Data Recorded    Recent Labs      18   0253   WBC  8.1   RBC  3.85*   HEMOGLOBIN  11.4*   HEMATOCRIT  34.7*   MCV  90.1   MCH  29.6   MCHC  32.9*   RDW  43.8   PLATELETCT  185   MPV  9.5     Recent Labs      18   0253   SODIUM  136   POTASSIUM  3.9   CHLORIDE  107   CO2  23   GLUCOSE  107*   BUN  19   CREATININE  1.11   CALCIUM  8.3*               Intake/Output       18 0700 - 18 0659 18 0700 - 18 0659      3230-0620 [id number] Total 0878-7784 4010-0399 Total       Intake    P.O.  --  360 360  --  -- --    P.O. -- 360 360 -- -- --    I.V.  1100  200 1300  --  -- --    Crystalloid Intake 3513 885 4113 -- -- --    Other  --  5 5  --  -- --    Medications (P.O./ Enteral Liquids) -- 5 5 -- -- --    Total Intake 2770 134 8864 -- -- --       Output    Urine  --  250 250  --  -- --    Urine Void (mL) (non-catheter) -- 250 250 -- -- --    Blood  110  -- 110  --  -- --    Est. Blood Loss (mL) 110 -- 110 -- -- --    Total Output 110 250 360 -- -- --       Net I/O      -- -- --            Intake/Output Summary (Last 24 hours) at 18 0809  Last data filed at 18 0400   Gross per 24 hour   Intake             1665 ml   Output              360 ml   Net             1305 ml            • lidocaine  0.5 mL Once PRN   • LR   Continuous   • atorvastatin  10 mg QHS   • lisinopril  40 mg QAM   • metoprolol SR  25 mg DAILY   • Pharmacy  Consult Request  1 Each PRN   • MD ALERT...Do not administer NSAIDS or ASPIRIN unless ORDERED By Neurosurgery  1 Each PRN   • docusate sodium  100 mg BID   • senna-docusate  1 Tab Nightly   • senna-docusate  1 Tab Q24HRS PRN   • polyethylene glycol/lytes  1 Packet BID PRN   • magnesium hydroxide  30 mL QDAY PRN   • bisacodyl  10 mg Q24HRS PRN   • fleet  1 Each Once PRN   • 0.9 % NaCl with KCl 20 mEq 1,000 mL   Continuous   • diphenhydrAMINE  25 mg Q6HRS PRN    Or   • diphenhydrAMINE  25 mg Q6HRS PRN   • ondansetron  4 mg Q4HRS PRN   • ondansetron  4 mg Q4HRS PRN   • methocarbamol  750 mg Q8HRS PRN   • ALPRAZolam  0.25 mg BID PRN   • labetalol  10 mg Q HOUR PRN   • benzocaine-menthol  1 Lozenge Q2HRS PRN   • vitamin D  5,000 Units DAILY   • HYDROcodone-acetaminophen  1-2 Tab Q4HRS PRN   • oxyCODONE-acetaminophen  1-2 Tab Q4HRS PRN   • morphine injection  2 mg Q2HRS PRN   • pneumococcal 13-Vidya Conj Vacc  0.5 mL Once       Assessment and Plan:  Hospital day #2  POD #1  Prophylactic anticoagulation: no    Plan:  1. Mobilize with PT/OT  2. He has a FWW at home  3. D/C home at 1100hrs

## 2018-05-24 NOTE — CARE PLAN
Problem: Safety  Goal: Will remain free from injury  Outcome: PROGRESSING AS EXPECTED  Patient side rails up x 2 call light within reach hourly rounding    Problem: Infection  Goal: Will remain free from infection  Outcome: PROGRESSING AS EXPECTED  IV antibiotics cefazolin

## 2018-05-24 NOTE — OR NURSING
Pt A&OX4. VSS. Pt on room air. Afebrile. Lower back dressing CDI. Foot drop to RLE present pre op per Dr. Haile. Pt states he does feel improvement to R toes, he can move them back and forth easier now. Denies numbness/tingling. 4/5 strength to RLE and 5/5 to LLE. Pt states pain to lower back tolerable 3/10 post pain medication administering. Pt has no complaints of nausea, tolerated sips of water. Pt's wife, Hannah, given update and room assignment. Report given to LICO Bolivar. Pt out of PACU via [facility] with transport.

## 2018-05-24 NOTE — DISCHARGE INSTRUCTIONS
Discharge Instructions    Discharged to home by car with relative. Discharged via wheelchair, hospital escort: Yes.  Special equipment needed: Walker    Be sure to schedule a follow-up appointment with your primary care doctor or any specialists as instructed.     Discharge Plan:   Diet Plan: Discussed  Activity Level: Discussed  Confirmed Follow up Appointment: Patient to Call and Schedule Appointment  Confirmed Symptoms Management: Discussed  Medication Reconciliation Updated: Yes  Influenza Vaccine Indication: Not indicated: Previously immunized this influenza season and > 8 years of age    I understand that a diet low in cholesterol, fat, and sodium is recommended for good health. Unless I have been given specific instructions below for another diet, I accept this instruction as my diet prescription.       Special Instructions:     Lumbar Laminectomy, Care After  Refer to this sheet in the next few weeks. These instructions provide you with information on caring for yourself after your procedure. Your health care provider may also give you more specific instructions. Your treatment has been planned according to current medical practices, but problems sometimes occur. Call your health care provider if you have any problems or questions after your procedure.  HOME CARE INSTRUCTIONS   · Check the incision twice a day for signs of infection. Some signs may include a foul-smelling, greenish or yellowish discharge from the wound, increased pain, or increased redness over the incision.  · Change your bandages about 24-36 hours after surgery, or as directed.  · You may shower once the bandage is removed, or as directed. Avoid tub baths, swimming, and hot tubs for 3 weeks or until your incision has healed completely. If you have stitches or staples, they may be removed 2-3 weeks after surgery, or as directed by your doctor.  · Daily exercise is helpful to prevent the return of problems. Walking is permitted. You may use  a treadmill without an incline. Cut down on activities and exercise if you have discomfort. You may also go up and down stairs as much as you can tolerate.  · Do not lift anything heavier than 15 lb. Avoid bending or twisting at the waist. Always bend your knees.  · Maintain strength and range of motion as instructed.  · Do not drive for 2-3 weeks, or as directed by your doctor. You may be a passenger for 20-30 minutes at a time. Lying back in the passenger seat may be more comfortable for you.  · Limit your sitting to intervals of 20-30 minutes. You should lie down or walk in between sitting periods. There are no limitations for sitting in a recliner chair.  · Only take over-the-counter or prescription medicines for pain, discomfort, or fever as directed by your health care provider.  SEEK MEDICAL CARE IF:   · There is increased bleeding (more than a small spot) from the wound.  · You notice redness, swelling, or increasing pain in the wound.  · Pus is coming from the wound.  · You have a fever for more than 2-3 days.  · You notice a foul smell coming from the wound or dressing.  · You have increasing pain in your wound.  SEEK IMMEDIATE MEDICAL CARE IF:   · You develop a rash.  · You have difficulty breathing.  · You have any allergic problems.  · You develop a headache or stiff neck that does not respond to pain relievers.  · You are unable to urinate.  · You develop new onset of pain, numbness, or weakness in the buttocks or lower extremities.  MAKE SURE YOU:   · Understand these instructions.  · Will watch your condition.  · Will get help right away if you are not doing well or get worse.     This information is not intended to replace advice given to you by your health care provider. Make sure you discuss any questions you have with your health care provider.     Document Released: 11/21/2005 Document Revised: 08/20/2014 Document Reviewed: 05/15/2014  Elsevier Interactive Patient Education ©2016 Elsevier  Inc.      · Is patient discharged on Warfarin / Coumadin?   No     Depression / Suicide Risk    As you are discharged from this [facility] facility, it is important to learn how to keep safe from harming yourself.    Recognize the warning signs:  · Abrupt changes in personality, positive or negative- including increase in energy   · Giving away possessions  · Change in eating patterns- significant weight changes-  positive or negative  · Change in sleeping patterns- unable to sleep or sleeping all the time   · Unwillingness or inability to communicate  · Depression  · Unusual sadness, discouragement and loneliness  · Talk of wanting to die  · Neglect of personal appearance   · Rebelliousness- reckless behavior  · Withdrawal from people/activities they love  · Confusion- inability to concentrate     If you or a loved one observes any of these behaviors or has concerns about self-harm, here's what you can do:  · Talk about it- your feelings and reasons for harming yourself  · Remove any means that you might use to hurt yourself (examples: pills, rope, extension cords, firearm)  · Get professional help from the community (Mental Health, Substance Abuse, psychological counseling)  · Do not be alone:Call your Safe Contact- someone whom you trust who will be there for you.  · Call your local CRISIS HOTLINE 021-9202 or 729-285-0822  · Call your local Children's Mobile Crisis Response Team [place] (066) 510-9709 or www.Goji  · Call the toll free National Suicide Prevention Hotlines   · National Suicide Prevention Lifeline 405-457-WZQQ (1928)  · National Hope Line Network 800-SUICIDE (408-5788)

## 2018-05-24 NOTE — PROGRESS NOTES
Pt A&OX4  Ambulated from [place] to bed tolerated well. 2 RN skin check done skin intact except dressing to mid lower back. CDI Denies numbness or tingling. To ALBERTINA

## 2018-05-24 NOTE — OR SURGEON
Immediate Post OP Note    PreOp Diagnosis: Status post lumbar laminectomy with new acute R L5/S1 disc herniation  PostOp Diagnosis: Status post lumbar laminectomy with new acute R L5/S1 disc herniation    Procedure(s):  LUMBAR LAMINECTOMY MICRODISKECTOMY-O-Arm / REDO RIGHT L5-S1 - Wound Class: Clean with Drain    Surgeon(s):  Betty Haile M.D.    Anesthesiologist/Type of Anesthesia:  Anesthesiologist: Joanie Mendez M.D./General    Surgical Staff:  Circulator: Tracie Samaniego R.N.; Josi Harley R.N.  Scrub Person: Harriet Smith Assist: Angel Luis Thompson P.A.-C.  Radiology Technologist: Riana Ruiz  Stealth : Bianka Dempsey R.N.    Assistants: Angel Luis Thompson PA-C,  Specimen: nil    Estimated Blood Loss: 50 cc    Findings: n/a    Complications: nil        5/23/2018 6:17 PM Betty Haile M.D.

## 2018-05-24 NOTE — PROGRESS NOTES
Pt educated on home care instructions, follow up, medications, diet, activity, incision care, bowel care, narcotics, and seeking medical attention. Verbalized understanding. Pt provided prescriptions and educated to fill in [place].

## 2018-05-24 NOTE — DISCHARGE SUMMARY
DATE OF ADMISSION:  05/23/2018    DATE OF DISCHARGE:  05/24/2018    DISCHARGE DIAGNOSES:  1.  Status post redo right L5-S1 hemilaminectomy.  2.  Right L5-S1 medial facetectomy.  3.  Right L5-S1 microdiskectomy.  4.  Status post previous L2-S1 laminectomy in 2013.  5.  Right footdrop.    OPERATION PERFORMED:  See above.    REASON FOR ADMISSION:  Patient is a pleasant 68-year-old male who underwent a   previous lumbar laminectomy in 2013.  He did quite well following this   procedure, but then began to develop some intermittent right leg pain and   weakness.  This persisted despite conservative therapies.  He had MRI imaging   that showed a large right acute L5-S1 disk herniation with superiorly migrated   fragment.  Due to these findings with the symptoms of weakness, he was   offered the above-mentioned surgery and he did consent.    HOSPITAL COURSE:  The patient underwent the above operation without any   complications and was transferred to the neurosurgery floor.  On postoperative   day #1, he was eating, drinking, mobilizing and voiding.  He noted that the   strength in his right leg had improved.  His pain was controlled with oral   analgesia.  At that time, it was determined that he met the criteria for   discharge and was discharged to home in stable condition.    DISCHARGE INSTRUCTIONS:  He is to be extremely cautious with any bending,   flexing, twisting about the low back.  He has followup appointments with our   office in 2 and 6 weeks' time, and he has been instructed to keep these.  He   is to contact our office with any other questions or concerns.  He may shower   in a couple of days, but he is not to submerge the wound until further   instructed.  He is to call our office with any other questions or concerns.    DISCHARGE MEDICATIONS:  1.  Keflex 500 mg.  2.  Percocet 5/325 mg.  3.  Robaxin 750 mg.    I once again have answered all of his questions to the best of my ability.  He   is now again  discharged to home in stable condition.       ____________________________________     JAMIA Hawk    DD:  05/24/2018 08:19:24  DT:  05/24/2018 11:23:27    D#:  [id number]  Job#:  [id number]    cc: DHEERAJ RAMOS MD, Adam Delgado MD , Adam West MD

## 2018-10-19 ENCOUNTER — TELEPHONE (OUTPATIENT)
Dept: CARDIOLOGY | Facility: MEDICAL CENTER | Age: 68
End: 2018-10-19

## 2018-10-19 NOTE — TELEPHONE ENCOUNTER
"S/w patient, he stated that he \"forgot\" to do his labs but will be completing them Monday morning at [facility].       Labs in [place].  "

## 2018-10-23 ENCOUNTER — OFFICE VISIT (OUTPATIENT)
Dept: CARDIOLOGY | Facility: MEDICAL CENTER | Age: 68
End: 2018-10-23
Payer: MEDICARE

## 2018-10-23 VITALS
HEIGHT: 71 IN | OXYGEN SATURATION: 97 % | WEIGHT: 204 LBS | HEART RATE: 88 BPM | BODY MASS INDEX: 28.56 KG/M2 | SYSTOLIC BLOOD PRESSURE: 140 MMHG | DIASTOLIC BLOOD PRESSURE: 74 MMHG

## 2018-10-23 DIAGNOSIS — Z79.899 HIGH RISK MEDICATION USE: ICD-10-CM

## 2018-10-23 DIAGNOSIS — E78.5 DYSLIPIDEMIA: ICD-10-CM

## 2018-10-23 DIAGNOSIS — I65.21 STENOSIS OF RIGHT CAROTID ARTERY: ICD-10-CM

## 2018-10-23 DIAGNOSIS — I25.10 CORONARY ARTERY DISEASE INVOLVING NATIVE CORONARY ARTERY OF NATIVE HEART WITHOUT ANGINA PECTORIS: ICD-10-CM

## 2018-10-23 DIAGNOSIS — Z95.5 HISTORY OF HEART ARTERY STENT: ICD-10-CM

## 2018-10-23 DIAGNOSIS — I10 HTN (HYPERTENSION), MALIGNANT: ICD-10-CM

## 2018-10-23 PROCEDURE — 99213 OFFICE O/P EST LOW 20 MIN: CPT | Performed by: INTERNAL MEDICINE

## 2018-10-23 RX ORDER — METOPROLOL SUCCINATE 100 MG/1
100 TABLET, EXTENDED RELEASE ORAL DAILY
Qty: 90 TAB | Refills: 3 | Status: SHIPPED | OUTPATIENT
Start: 2018-10-23 | End: 2018-12-27

## 2018-10-23 RX ORDER — GABAPENTIN 300 MG/1
CAPSULE ORAL
Refills: 5 | COMMUNITY
Start: 2018-10-06

## 2018-10-23 RX ORDER — PREDNISOLONE ACETATE 10 MG/ML
SUSPENSION/ DROPS OPHTHALMIC
Refills: 0 | COMMUNITY
Start: 2018-09-21 | End: 2018-10-23

## 2018-10-23 RX ORDER — ROSUVASTATIN CALCIUM 40 MG/1
40 TABLET, COATED ORAL DAILY
Qty: 90 TAB | Refills: 3 | Status: SHIPPED | OUTPATIENT
Start: 2018-10-23 | End: 2019-04-23 | Stop reason: SDUPTHER

## 2018-10-23 RX ORDER — CLOPIDOGREL BISULFATE 75 MG/1
75 TABLET ORAL DAILY
Qty: 90 TAB | Refills: 3 | Status: SHIPPED | OUTPATIENT
Start: 2018-10-23 | End: 2019-04-23 | Stop reason: SDUPTHER

## 2018-10-23 ASSESSMENT — ENCOUNTER SYMPTOMS
EYE DISCHARGE: 0
ORTHOPNEA: 0
HALLUCINATIONS: 0
SPEECH CHANGE: 0
DIZZINESS: 0
ABDOMINAL PAIN: 0
HEADACHES: 0
WEIGHT LOSS: 0
DEPRESSION: 0
SHORTNESS OF BREATH: 0
VOMITING: 0
BLURRED VISION: 0
COUGH: 0
SENSORY CHANGE: 0
CHILLS: 0
PND: 0
DOUBLE VISION: 0
PALPITATIONS: 0
EYE PAIN: 0
LOSS OF CONSCIOUSNESS: 0
MYALGIAS: 0
FALLS: 0
BLOOD IN STOOL: 0
FEVER: 0
BRUISES/BLEEDS EASILY: 0
CLAUDICATION: 0
NAUSEA: 0

## 2018-10-23 NOTE — LETTER
"     [facility]   [address]  [place] [address]  Phone: [phone]  Fax: [phone]              Tuan Bernard  1950    Encounter Date: 10/23/2018    Ted West M.D.          PROGRESS NOTE:  Chief Complaint   Patient presents with   • Coronary Artery Disease       Subjective:   Tuan Bernard is a 68 y.o. male who presents today for cardiac care due to prior history of CAD s/p stented coronary artery. He does have history of coronary arterial disease for which he underwent PCI and stent placement in his proximal and mid LAD in 2004. He has been doing well since then. No repeated recurrent episodes of heart attacks.     He is being worked up for carotid disease with vascular surgeon.     No chest pain or shortness of breath.     I have reviewed patient's ECG, which shows normal sinus rhythm, normal HI, QT intervals. No evidence of acute coronary syndrome.    Had back surgery without cardiac problems.    Past Medical History:   Diagnosis Date   • Arthritis 05/22/2018    \"Spine\"   • CAD (coronary artery disease)    • Cataract 05/22/2018    \"Left Eye\"   • H/O heart artery stent    • Heart attack ([facility])     2005   • High cholesterol 05/22/2018   • Hyperlipidemia    • Hypertension 05/22/2018   • Pain 05/22/2018    \"Back Pain\"   • PVD (peripheral vascular disease) ([facility])    • Snoring 05/22/2018    Has not had a sleep study   • Stroke ([facility]) 2017     Pt. denies any residual weakness or problems from stroke     Past Surgical History:   Procedure Laterality Date   • LUMBAR LAMINECTOMY DISKECTOMY Right 5/23/2018    Procedure: LUMBAR LAMINECTOMY MICRODISKECTOMY-O-Arm / REDO RIGHT L5-S1;  Surgeon: Betty Haile M.D.;  Location: SURGERY [facility];  Service: Neurosurgery   • OTHER  2013    \"Back Surgery\"   • OTHER CARDIAC SURGERY  2005    \"Heart Stent\"   • ANGIOPLASTY     • CARDIAC CATH     • CATARACT EXTRACTION WITH IOL      \"Right Eye\"   "     Family History   Problem Relation Age of Onset   • Heart Disease Mother      Social History     Social History   • Marital status:      Spouse name: N/A   • Number of children: N/A   • Years of education: N/A     Occupational History   • Not on file.     Social History Main Topics   • Smoking status: Former Smoker     Types: Cigarettes   • Smokeless tobacco: Never Used      Comment: Quit 2003   • Alcohol use 1.2 oz/week     2 Glasses of wine per week      Comment: 2/day   • Drug use: No   • Sexual activity: Not on file     Other Topics Concern   • Not on file     Social History Narrative   • No narrative on file     No Known Allergies  Outpatient Encounter Prescriptions as of 10/23/2018   Medication Sig Dispense Refill   • gabapentin (NEURONTIN) 300 MG Cap TAKE 2 CAPSULE(S) TWICE A DAY BY ORAL ROUTE FOR 30 DAYS.  5   • rosuvastatin (CRESTOR) 40 MG tablet Take 1 Tab by mouth every day. 90 Tab 3   • metoprolol SR (TOPROL XL) 100 MG TABLET SR 24 HR Take 1 Tab by mouth every day. 90 Tab 3   • clopidogrel (PLAVIX) 75 MG Tab Take 1 Tab by mouth every day. 90 Tab 3   • lisinopril (PRINIVIL, ZESTRIL) 40 MG tablet Take 40 mg by mouth every morning.     • Cholecalciferol (VITAMIN D PO) Take 5,000 Int'l Units by mouth every day.     • [DISCONTINUED] prednisoLONE acetate (PRED FORTE) 1 % Suspension INSTILL 1 DROP TO LEFT EYE TWICE A DAY  0   • [DISCONTINUED] methocarbamol (ROBAXIN) 750 MG Tab Take 1 Tab by mouth every 8 hours as needed. 90 Tab 0   • [DISCONTINUED] cephALEXin (KEFLEX) 500 MG Cap Take 1 Cap by mouth 4 times a day. 20 Cap 0   • [DISCONTINUED] atorvastatin (LIPITOR) 10 MG Tab Take 20 mg by mouth every day.     • [DISCONTINUED] metoprolol SR (TOPROL XL) 25 MG TABLET SR 24 HR Take 50 mg by mouth every day.       No facility-administered encounter medications on file as of 10/23/2018.      Review of Systems   Constitutional: Negative for chills, fever, malaise/fatigue and weight loss.   HENT: Negative for  "ear discharge, ear pain, hearing loss and nosebleeds.    Eyes: Negative for blurred vision, double vision, pain and discharge.   Respiratory: Negative for cough and shortness of breath.    Cardiovascular: Negative for chest pain, palpitations, orthopnea, claudication, leg swelling and PND.   Gastrointestinal: Negative for abdominal pain, blood in stool, melena, nausea and vomiting.   Genitourinary: Negative for dysuria and hematuria.   Musculoskeletal: Negative for falls, joint pain and myalgias.   Skin: Negative for itching and rash.   Neurological: Negative for dizziness, sensory change, speech change, loss of consciousness and headaches.   Endo/Heme/Allergies: Negative for environmental allergies. Does not bruise/bleed easily.   Psychiatric/Behavioral: Negative for depression, hallucinations and suicidal ideas.        Objective:   /74 (BP Location: Left arm, Patient Position: Sitting, BP Cuff Size: Adult)   Pulse 88   Ht 1.803 m (5' 11\")   Wt 92.5 kg (204 lb)   SpO2 97%   BMI 28.45 kg/m²      Physical Exam   Constitutional: He is oriented to person, place, and time. No distress.   HENT:   Head: Normocephalic and atraumatic.   Right Ear: External ear normal.   Left Ear: External ear normal.   Eyes: Right eye exhibits no discharge. Left eye exhibits no discharge.   Neck: No JVD present. No thyromegaly present.   Cardiovascular: Normal rate, regular rhythm, normal heart sounds and intact distal pulses.  Exam reveals no gallop and no friction rub.    No murmur heard.  Pulmonary/Chest: Breath sounds normal. No respiratory distress.   Abdominal: Bowel sounds are normal. He exhibits no distension. There is no tenderness.   Musculoskeletal: He exhibits no edema or tenderness.   Neurological: He is alert and oriented to person, place, and time. No cranial nerve deficit.   Skin: Skin is warm and dry. He is not diaphoretic.   Psychiatric: He has a normal mood and affect. His behavior is normal.   Nursing note and " vitals reviewed.      Assessment:     1. History of heart artery stent prx/mid LAD with NADIRA in 2004  rosuvastatin (CRESTOR) 40 MG tablet    metoprolol SR (TOPROL XL) 100 MG TABLET SR 24 HR    clopidogrel (PLAVIX) 75 MG Tab   2. HTN (hypertension), malignant  metoprolol SR (TOPROL XL) 100 MG TABLET SR 24 HR   3. Dyslipidemia  rosuvastatin (CRESTOR) 40 MG tablet   4. Coronary artery disease involving native coronary artery of native heart without angina pectoris  rosuvastatin (CRESTOR) 40 MG tablet    metoprolol SR (TOPROL XL) 100 MG TABLET SR 24 HR    clopidogrel (PLAVIX) 75 MG Tab   5. Stenosis of right carotid artery  metoprolol SR (TOPROL XL) 100 MG TABLET SR 24 HR    clopidogrel (PLAVIX) 75 MG Tab   6. High risk medication use         Medical Decision Making:  Today's Assessment / Status / Plan:   Blood pressure is slightly high.  Will increase Metoprolol SR (Toprol XL) To 100 mg po daily for better BP control.  Will switch atorvastatin to Rosuvastatin 40 mg po daily due to prior history of coronary stent (CAD).  Continue ASA and Plavix for CAD and PAD.  Continue Lisinopril 40 mg po daily for CAD and BP control.    I will see patient back in clinic with lab tests and studies results in 6 months.    I thank you Dr. Marion for referring patient to our Cardiology Clinic today.          Sebastian Marion M.D.  [address] [place] [id number]  VIA Facsimile: [phone]

## 2018-10-23 NOTE — PROGRESS NOTES
"Chief Complaint   Patient presents with   • Coronary Artery Disease       Subjective:   Tuan Bernard is a 68 y.o. male who presents today for cardiac care due to prior history of CAD s/p stented coronary artery. He does have history of coronary arterial disease for which he underwent PCI and stent placement in his proximal and mid LAD in 2004. He has been doing well since then. No repeated recurrent episodes of heart attacks.     He is being worked up for carotid disease with vascular surgeon.     No chest pain or shortness of breath.     I have reviewed patient's ECG, which shows normal sinus rhythm, normal VA, QT intervals. No evidence of acute coronary syndrome.    Had back surgery without cardiac problems.    Past Medical History:   Diagnosis Date   • Arthritis 05/22/2018    \"Spine\"   • CAD (coronary artery disease)    • Cataract 05/22/2018    \"Left Eye\"   • H/O heart artery stent    • Heart attack ([facility])     2005   • High cholesterol 05/22/2018   • Hyperlipidemia    • Hypertension 05/22/2018   • Pain 05/22/2018    \"Back Pain\"   • PVD (peripheral vascular disease) ([facility])    • Snoring 05/22/2018    Has not had a sleep study   • Stroke ([facility]) 2017     Pt. denies any residual weakness or problems from stroke     Past Surgical History:   Procedure Laterality Date   • LUMBAR LAMINECTOMY DISKECTOMY Right 5/23/2018    Procedure: LUMBAR LAMINECTOMY MICRODISKECTOMY-O-Arm / REDO RIGHT L5-S1;  Surgeon: Betty Haile M.D.;  Location: SURGERY [facility];  Service: Neurosurgery   • OTHER  2013    \"Back Surgery\"   • OTHER CARDIAC SURGERY  2005    \"Heart Stent\"   • ANGIOPLASTY     • CARDIAC CATH     • CATARACT EXTRACTION WITH IOL      \"Right Eye\"     Family History   Problem Relation Age of Onset   • Heart Disease Mother      Social History     Social History   • Marital status:      Spouse name: N/A   • Number of children: N/A   • Years of education: N/A     Occupational History   • Not on file.     Social " History Main Topics   • Smoking status: Former Smoker     Types: Cigarettes   • Smokeless tobacco: Never Used      Comment: Quit 2003   • Alcohol use 1.2 oz/week     2 Glasses of wine per week      Comment: 2/day   • Drug use: No   • Sexual activity: Not on file     Other Topics Concern   • Not on file     Social History Narrative   • No narrative on file     No Known Allergies  Outpatient Encounter Prescriptions as of 10/23/2018   Medication Sig Dispense Refill   • gabapentin (NEURONTIN) 300 MG Cap TAKE 2 CAPSULE(S) TWICE A DAY BY ORAL ROUTE FOR 30 DAYS.  5   • rosuvastatin (CRESTOR) 40 MG tablet Take 1 Tab by mouth every day. 90 Tab 3   • metoprolol SR (TOPROL XL) 100 MG TABLET SR 24 HR Take 1 Tab by mouth every day. 90 Tab 3   • clopidogrel (PLAVIX) 75 MG Tab Take 1 Tab by mouth every day. 90 Tab 3   • lisinopril (PRINIVIL, ZESTRIL) 40 MG tablet Take 40 mg by mouth every morning.     • Cholecalciferol (VITAMIN D PO) Take 5,000 Int'l Units by mouth every day.     • [DISCONTINUED] prednisoLONE acetate (PRED FORTE) 1 % Suspension INSTILL 1 DROP TO LEFT EYE TWICE A DAY  0   • [DISCONTINUED] methocarbamol (ROBAXIN) 750 MG Tab Take 1 Tab by mouth every 8 hours as needed. 90 Tab 0   • [DISCONTINUED] cephALEXin (KEFLEX) 500 MG Cap Take 1 Cap by mouth 4 times a day. 20 Cap 0   • [DISCONTINUED] atorvastatin (LIPITOR) 10 MG Tab Take 20 mg by mouth every day.     • [DISCONTINUED] metoprolol SR (TOPROL XL) 25 MG TABLET SR 24 HR Take 50 mg by mouth every day.       No facility-administered encounter medications on file as of 10/23/2018.      Review of Systems   Constitutional: Negative for chills, fever, malaise/fatigue and weight loss.   HENT: Negative for ear discharge, ear pain, hearing loss and nosebleeds.    Eyes: Negative for blurred vision, double vision, pain and discharge.   Respiratory: Negative for cough and shortness of breath.    Cardiovascular: Negative for chest pain, palpitations, orthopnea, claudication, leg  "swelling and PND.   Gastrointestinal: Negative for abdominal pain, blood in stool, melena, nausea and vomiting.   Genitourinary: Negative for dysuria and hematuria.   Musculoskeletal: Negative for falls, joint pain and myalgias.   Skin: Negative for itching and rash.   Neurological: Negative for dizziness, sensory change, speech change, loss of consciousness and headaches.   Endo/Heme/Allergies: Negative for environmental allergies. Does not bruise/bleed easily.   Psychiatric/Behavioral: Negative for depression, hallucinations and suicidal ideas.        Objective:   /74 (BP Location: Left arm, Patient Position: Sitting, BP Cuff Size: Adult)   Pulse 88   Ht 1.803 m (5' 11\")   Wt 92.5 kg (204 lb)   SpO2 97%   BMI 28.45 kg/m²     Physical Exam   Constitutional: He is oriented to person, place, and time. No distress.   HENT:   Head: Normocephalic and atraumatic.   Right Ear: External ear normal.   Left Ear: External ear normal.   Eyes: Right eye exhibits no discharge. Left eye exhibits no discharge.   Neck: No JVD present. No thyromegaly present.   Cardiovascular: Normal rate, regular rhythm, normal heart sounds and intact distal pulses.  Exam reveals no gallop and no friction rub.    No murmur heard.  Pulmonary/Chest: Breath sounds normal. No respiratory distress.   Abdominal: Bowel sounds are normal. He exhibits no distension. There is no tenderness.   Musculoskeletal: He exhibits no edema or tenderness.   Neurological: He is alert and oriented to person, place, and time. No cranial nerve deficit.   Skin: Skin is warm and dry. He is not diaphoretic.   Psychiatric: He has a normal mood and affect. His behavior is normal.   Nursing note and vitals reviewed.      Assessment:     1. History of heart artery stent prx/mid LAD with NADIRA in 2004  rosuvastatin (CRESTOR) 40 MG tablet    metoprolol SR (TOPROL XL) 100 MG TABLET SR 24 HR    clopidogrel (PLAVIX) 75 MG Tab   2. HTN (hypertension), malignant  metoprolol SR " (TOPROL XL) 100 MG TABLET SR 24 HR   3. Dyslipidemia  rosuvastatin (CRESTOR) 40 MG tablet   4. Coronary artery disease involving native coronary artery of native heart without angina pectoris  rosuvastatin (CRESTOR) 40 MG tablet    metoprolol SR (TOPROL XL) 100 MG TABLET SR 24 HR    clopidogrel (PLAVIX) 75 MG Tab   5. Stenosis of right carotid artery  metoprolol SR (TOPROL XL) 100 MG TABLET SR 24 HR    clopidogrel (PLAVIX) 75 MG Tab   6. High risk medication use         Medical Decision Making:  Today's Assessment / Status / Plan:   Blood pressure is slightly high.  Will increase Metoprolol SR (Toprol XL) To 100 mg po daily for better BP control.  Will switch atorvastatin to Rosuvastatin 40 mg po daily due to prior history of coronary stent (CAD).  Continue ASA and Plavix for CAD and PAD.  Continue Lisinopril 40 mg po daily for CAD and BP control.    I will see patient back in clinic with lab tests and studies results in 6 months.    I thank you Dr. Marion for referring patient to our Cardiology Clinic today.

## 2018-12-27 ENCOUNTER — TELEPHONE (OUTPATIENT)
Dept: CARDIOLOGY | Facility: MEDICAL CENTER | Age: 68
End: 2018-12-27

## 2018-12-27 RX ORDER — METOPROLOL SUCCINATE 50 MG/1
50 TABLET, EXTENDED RELEASE ORAL DAILY
Qty: 90 TAB | Refills: 1 | Status: SHIPPED | OUTPATIENT
Start: 2018-12-27 | End: 2019-04-23 | Stop reason: SDUPTHER

## 2018-12-27 NOTE — TELEPHONE ENCOUNTER
RE: new RX   Received: Today   Message Contents   DECLAN Perera R.N.             Toprol XL 50 mg Daily.     TT    Previous Messages      ----- Message -----   From: Aide No R.N.   Sent: 12/27/2018   1:22 PM   To: Ted West M.D.   Subject: FW: new RX                                           ----- Message -----   From: Mari Clark, Med Ass't   Sent: 12/27/2018   1:19 PM   To: Aide No R.N.   Subject: new RX                                           Patient reached out to his pharmacist concerned about his dose for:   metoprolol SR (TOPROL XL) 100 MG TABLET SR 24 HR     He would like a lower dose and states that the 100mg might be too high for him.     The pharmacy is waiting for a new RX         New Rx routed to pharmacy.

## 2019-01-14 ENCOUNTER — HOSPITAL ENCOUNTER (OUTPATIENT)
Dept: RADIOLOGY | Facility: MEDICAL CENTER | Age: 69
End: 2019-01-14
Attending: SURGERY
Payer: MEDICARE

## 2019-01-14 DIAGNOSIS — I65.21 OCCLUSION OF RIGHT CAROTID ARTERY: ICD-10-CM

## 2019-01-14 PROCEDURE — 93880 EXTRACRANIAL BILAT STUDY: CPT

## 2019-04-18 ENCOUNTER — TELEPHONE (OUTPATIENT)
Dept: CARDIOLOGY | Facility: MEDICAL CENTER | Age: 69
End: 2019-04-18

## 2019-04-18 NOTE — TELEPHONE ENCOUNTER
NARENDRA to remind patient to complete fasting labs before upcoming appt with Dr. West on 04/23/2019.

## 2019-04-23 ENCOUNTER — OFFICE VISIT (OUTPATIENT)
Dept: CARDIOLOGY | Facility: MEDICAL CENTER | Age: 69
End: 2019-04-23
Payer: MEDICARE

## 2019-04-23 ENCOUNTER — TELEPHONE (OUTPATIENT)
Dept: CARDIOLOGY | Facility: MEDICAL CENTER | Age: 69
End: 2019-04-23

## 2019-04-23 VITALS
OXYGEN SATURATION: 98 % | HEART RATE: 72 BPM | WEIGHT: 208 LBS | DIASTOLIC BLOOD PRESSURE: 90 MMHG | BODY MASS INDEX: 29.12 KG/M2 | HEIGHT: 71 IN | SYSTOLIC BLOOD PRESSURE: 152 MMHG

## 2019-04-23 DIAGNOSIS — I10 HTN (HYPERTENSION), MALIGNANT: ICD-10-CM

## 2019-04-23 DIAGNOSIS — I25.10 CORONARY ARTERY DISEASE INVOLVING NATIVE CORONARY ARTERY OF NATIVE HEART WITHOUT ANGINA PECTORIS: ICD-10-CM

## 2019-04-23 DIAGNOSIS — Z95.5 HISTORY OF HEART ARTERY STENT: ICD-10-CM

## 2019-04-23 DIAGNOSIS — I65.21 STENOSIS OF RIGHT CAROTID ARTERY: ICD-10-CM

## 2019-04-23 DIAGNOSIS — Z01.810 PREOP CARDIOVASCULAR EXAM: ICD-10-CM

## 2019-04-23 DIAGNOSIS — E78.5 DYSLIPIDEMIA: ICD-10-CM

## 2019-04-23 DIAGNOSIS — E78.2 MIXED HYPERLIPIDEMIA: ICD-10-CM

## 2019-04-23 DIAGNOSIS — Z79.899 HIGH RISK MEDICATION USE: ICD-10-CM

## 2019-04-23 LAB — EKG IMPRESSION: NORMAL

## 2019-04-23 PROCEDURE — 99214 OFFICE O/P EST MOD 30 MIN: CPT | Performed by: INTERNAL MEDICINE

## 2019-04-23 PROCEDURE — 93000 ELECTROCARDIOGRAM COMPLETE: CPT | Performed by: INTERNAL MEDICINE

## 2019-04-23 RX ORDER — CLOPIDOGREL BISULFATE 75 MG/1
75 TABLET ORAL DAILY
Qty: 90 TAB | Refills: 3 | Status: SHIPPED | OUTPATIENT
Start: 2019-04-23

## 2019-04-23 RX ORDER — METOPROLOL SUCCINATE 50 MG/1
50 TABLET, EXTENDED RELEASE ORAL 2 TIMES DAILY
Qty: 180 TAB | Refills: 3 | Status: SHIPPED | OUTPATIENT
Start: 2019-04-23 | End: 2019-04-23 | Stop reason: SDUPTHER

## 2019-04-23 RX ORDER — ROSUVASTATIN CALCIUM 40 MG/1
40 TABLET, COATED ORAL DAILY
Qty: 90 TAB | Refills: 3 | Status: SHIPPED | OUTPATIENT
Start: 2019-04-23

## 2019-04-23 RX ORDER — CLOPIDOGREL BISULFATE 75 MG/1
75 TABLET ORAL DAILY
Qty: 90 TAB | Refills: 3 | Status: SHIPPED | OUTPATIENT
Start: 2019-04-23 | End: 2019-04-23 | Stop reason: SDUPTHER

## 2019-04-23 RX ORDER — LISINOPRIL 40 MG/1
TABLET ORAL
COMMUNITY
End: 2019-04-23

## 2019-04-23 RX ORDER — ROSUVASTATIN CALCIUM 40 MG/1
40 TABLET, COATED ORAL DAILY
Qty: 90 TAB | Refills: 3 | Status: SHIPPED | OUTPATIENT
Start: 2019-04-23 | End: 2019-04-23 | Stop reason: SDUPTHER

## 2019-04-23 RX ORDER — LISINOPRIL 40 MG/1
40 TABLET ORAL EVERY MORNING
Qty: 90 TAB | Refills: 3 | Status: SHIPPED | OUTPATIENT
Start: 2019-04-23 | End: 2019-04-23 | Stop reason: SDUPTHER

## 2019-04-23 RX ORDER — METOPROLOL SUCCINATE 50 MG/1
50 TABLET, EXTENDED RELEASE ORAL 2 TIMES DAILY
Qty: 180 TAB | Refills: 3 | Status: SHIPPED | OUTPATIENT
Start: 2019-04-23

## 2019-04-23 RX ORDER — LISINOPRIL 40 MG/1
40 TABLET ORAL EVERY MORNING
Qty: 90 TAB | Refills: 3 | Status: SHIPPED | OUTPATIENT
Start: 2019-04-23

## 2019-04-23 ASSESSMENT — ENCOUNTER SYMPTOMS
ABDOMINAL PAIN: 0
BRUISES/BLEEDS EASILY: 0
SENSORY CHANGE: 0
PND: 0
COUGH: 0
FALLS: 0
DEPRESSION: 0
CLAUDICATION: 0
EYE DISCHARGE: 0
HEADACHES: 0
HALLUCINATIONS: 0
EYE PAIN: 0
NAUSEA: 0
MYALGIAS: 0
ORTHOPNEA: 0
FEVER: 0
DIZZINESS: 0
CHILLS: 0
PALPITATIONS: 0
DOUBLE VISION: 0
WEIGHT LOSS: 0
SHORTNESS OF BREATH: 0
BLURRED VISION: 0
LOSS OF CONSCIOUSNESS: 0
SPEECH CHANGE: 0
BLOOD IN STOOL: 0
VOMITING: 0

## 2019-04-23 NOTE — TELEPHONE ENCOUNTER
pharmacy change   Received: Today   Message Contents   Amrita Resendiz, Rafael Ass't  Aide No, RNATALIA.   Phone Number: [phone]             Aide/MARQUITA     At checkout the patient mentioned he forgot to tell TT that he wanted to change pharmacies the the [facility] pharmacy on [address] [facility] on   [address].     metoprolol SR (TOPROL XL) 50 MG TABLET SR 24 HR   clopidogrel (PLAVIX) 75 MG Tab   rosuvastatin (CRESTOR) 40 MG tablet   lisinopril (PRINIVIL, ZESTRIL) 40 MG tablet     Any questions please call pt.  Thank you        Rxs re-routed to above requested pharmacy.

## 2019-04-23 NOTE — PROGRESS NOTES
"Chief Complaint   Patient presents with   • Hypertension       Subjective:   Tuan Bernard is a 69 y.o. male who presents today for cardiac care and preop evaluation before his urgent back surgery. Patient's legs are about to give out on him. He has a lot of tingling and numbness sensation in his legs. He does have history of coronary arterial disease for which he underwent PCI and stent placement in his proximal and mid LAD in 2004. He has been doing well since then. No repeated recurrent episodes of heart attacks.     He is being worked up for carotid disease with vascular surgeon.     No chest pain or shortness of breath.     I have reviewed patient's ECG, which shows normal sinus rhythm, normal MT, QT intervals. No evidence of acute coronary syndrome.    Past Medical History:   Diagnosis Date   • Arthritis 05/22/2018    \"Spine\"   • CAD (coronary artery disease)    • Cataract 05/22/2018    \"Left Eye\"   • H/O heart artery stent    • Heart attack ([facility])     2005   • High cholesterol 05/22/2018   • Hyperlipidemia    • Hypertension 05/22/2018   • Pain 05/22/2018    \"Back Pain\"   • PVD (peripheral vascular disease) (Former[facility])    • Snoring 05/22/2018    Has not had a sleep study   • Stroke ([facility]) 2017     Pt. denies any residual weakness or problems from stroke     Past Surgical History:   Procedure Laterality Date   • LUMBAR LAMINECTOMY DISKECTOMY Right 5/23/2018    Procedure: LUMBAR LAMINECTOMY MICRODISKECTOMY-O-Arm / REDO RIGHT L5-S1;  Surgeon: Betty Haile M.D.;  Location: SURGERY [facility];  Service: Neurosurgery   • OTHER  2013    \"Back Surgery\"   • OTHER CARDIAC SURGERY  2005    \"Heart Stent\"   • ANGIOPLASTY     • CATARACT EXTRACTION WITH IOL      \"Right Eye\"   • ZZZ CARDIAC CATH       Family History   Problem Relation Age of Onset   • Heart Disease Mother      Social History     Social History   • Marital status:      Spouse name: N/A   • Number of children: N/A   • Years of education: N/A "     Occupational History   • Not on file.     Social History Main Topics   • Smoking status: Former Smoker     Types: Cigarettes   • Smokeless tobacco: Never Used      Comment: Quit 2003   • Alcohol use 1.2 oz/week     2 Glasses of wine per week      Comment: 2/day   • Drug use: No   • Sexual activity: Not on file     Other Topics Concern   • Not on file     Social History Narrative   • No narrative on file     No Known Allergies  Outpatient Encounter Prescriptions as of 4/23/2019   Medication Sig Dispense Refill   • lisinopril (PRINIVIL, ZESTRIL) 40 MG tablet lisinopril 40 mg tablet   Take 1 tablet every day by oral route.     • lisinopril (PRINIVIL, ZESTRIL) 40 MG tablet Take 1 Tab by mouth every morning. 90 Tab 3   • rosuvastatin (CRESTOR) 40 MG tablet Take 1 Tab by mouth every day. 90 Tab 3   • clopidogrel (PLAVIX) 75 MG Tab Take 1 Tab by mouth every day. 90 Tab 3   • metoprolol SR (TOPROL XL) 50 MG TABLET SR 24 HR Take 1 Tab by mouth 2 Times a Day. 180 Tab 3   • Cholecalciferol (VITAMIN D PO) Take 5,000 Int'l Units by mouth every day.     • [DISCONTINUED] metoprolol SR (TOPROL XL) 50 MG TABLET SR 24 HR Take 1 Tab by mouth every day. 90 Tab 1   • gabapentin (NEURONTIN) 300 MG Cap TAKE 2 CAPSULE(S) TWICE A DAY BY ORAL ROUTE FOR 30 DAYS.  5   • [DISCONTINUED] rosuvastatin (CRESTOR) 40 MG tablet Take 1 Tab by mouth every day. 90 Tab 3   • [DISCONTINUED] clopidogrel (PLAVIX) 75 MG Tab Take 1 Tab by mouth every day. 90 Tab 3   • [DISCONTINUED] lisinopril (PRINIVIL, ZESTRIL) 40 MG tablet Take 40 mg by mouth every morning.       No facility-administered encounter medications on file as of 4/23/2019.      Review of Systems   Constitutional: Negative for chills, fever, malaise/fatigue and weight loss.   HENT: Negative for ear discharge, ear pain, hearing loss and nosebleeds.    Eyes: Negative for blurred vision, double vision, pain and discharge.   Respiratory: Negative for cough and shortness of breath.   "  Cardiovascular: Negative for chest pain, palpitations, orthopnea, claudication, leg swelling and PND.   Gastrointestinal: Negative for abdominal pain, blood in stool, melena, nausea and vomiting.   Genitourinary: Negative for dysuria and hematuria.   Musculoskeletal: Negative for falls, joint pain and myalgias.   Skin: Negative for itching and rash.   Neurological: Negative for dizziness, sensory change, speech change, loss of consciousness and headaches.   Endo/Heme/Allergies: Negative for environmental allergies. Does not bruise/bleed easily.   Psychiatric/Behavioral: Negative for depression, hallucinations and suicidal ideas.        Objective:   /90 (BP Location: Left arm, Patient Position: Sitting, BP Cuff Size: Adult)   Pulse 72   Ht 1.803 m (5' 11\")   Wt 94.3 kg (208 lb)   SpO2 98%   BMI 29.01 kg/m²     Physical Exam   Constitutional: He is oriented to person, place, and time. No distress.   HENT:   Head: Normocephalic and atraumatic.   Right Ear: External ear normal.   Left Ear: External ear normal.   Eyes: Right eye exhibits no discharge. Left eye exhibits no discharge.   Neck: No JVD present. No thyromegaly present.   Cardiovascular: Normal rate, regular rhythm, normal heart sounds and intact distal pulses.  Exam reveals no gallop and no friction rub.    No murmur heard.  Pulmonary/Chest: Breath sounds normal. No respiratory distress.   Abdominal: Bowel sounds are normal. He exhibits no distension. There is no tenderness.   Musculoskeletal: He exhibits no edema or tenderness.   Neurological: He is alert and oriented to person, place, and time. No cranial nerve deficit.   Skin: Skin is warm and dry. He is not diaphoretic.   Psychiatric: He has a normal mood and affect. His behavior is normal.   Nursing note and vitals reviewed.      Assessment:     1. History of heart artery stent prx/mid LAD with NADIRA in 2004  rosuvastatin (CRESTOR) 40 MG tablet    clopidogrel (PLAVIX) 75 MG Tab    EKG   2. HTN " (hypertension), malignant  lisinopril (PRINIVIL, ZESTRIL) 40 MG tablet    metoprolol SR (TOPROL XL) 50 MG TABLET SR 24 HR    EKG   3. Mixed hyperlipidemia     4. Coronary artery disease involving native coronary artery of native heart without angina pectoris  rosuvastatin (CRESTOR) 40 MG tablet    clopidogrel (PLAVIX) 75 MG Tab    EKG   5. Stenosis of right carotid artery     6. High risk medication use     7. Preop cardiovascular exam  EKG   8. Dyslipidemia         Medical Decision Making:  Today's Assessment / Status / Plan:   Blood pressure is high. Will increase Toprol XL to 50 mg bid. Continue Lisinopril 40 mg po daily.    For his stented coronary artery and CAD, will continue Rosuvastatin, ASA, Plavix.    Patient remains low to moderate cardiovascular risk for her intended surgery of foot.    I will see patient back in clinic with lab tests and studies results in 6 months.    I thank you Dr. Marion for referring patient to our Cardiology Clinic today.

## 2019-04-23 NOTE — LETTER
"     [facility]   [address], [address]  [place] [address]  Phone: [phone]  Fax: [phone]              Tuan Bernard  1950    Encounter Date: 4/23/2019    Ted West M.D.          PROGRESS NOTE:  Chief Complaint   Patient presents with   • Hypertension       Subjective:   Tuan Bernard is a 69 y.o. male who presents today for cardiac care and preop evaluation before his urgent back surgery. Patient's legs are about to give out on him. He has a lot of tingling and numbness sensation in his legs. He does have history of coronary arterial disease for which he underwent PCI and stent placement in his proximal and mid LAD in 2004. He has been doing well since then. No repeated recurrent episodes of heart attacks.     He is being worked up for carotid disease with vascular surgeon.     No chest pain or shortness of breath.     I have reviewed patient's ECG, which shows normal sinus rhythm, normal MS, QT intervals. No evidence of acute coronary syndrome.    Past Medical History:   Diagnosis Date   • Arthritis 05/22/2018    \"Spine\"   • CAD (coronary artery disease)    • Cataract 05/22/2018    \"Left Eye\"   • H/O heart artery stent    • Heart attack ([facility])     2005   • High cholesterol 05/22/2018   • Hyperlipidemia    • Hypertension 05/22/2018   • Pain 05/22/2018    \"Back Pain\"   • PVD (peripheral vascular disease) ([facility])    • Snoring 05/22/2018    Has not had a sleep study   • Stroke ([facility]) 2017     Pt. denies any residual weakness or problems from stroke     Past Surgical History:   Procedure Laterality Date   • LUMBAR LAMINECTOMY DISKECTOMY Right 5/23/2018    Procedure: LUMBAR LAMINECTOMY MICRODISKECTOMY-O-Arm / REDO RIGHT L5-S1;  Surgeon: Betty Haile M.D.;  Location: SURGERY [facility];  Service: Neurosurgery   • OTHER  2013    \"Back Surgery\"   • OTHER CARDIAC SURGERY  2005    \"Heart Stent\"   • ANGIOPLASTY     • CATARACT EXTRACTION " "WITH IOL      \"Right Eye\"   • ZZZ CARDIAC CATH       Family History   Problem Relation Age of Onset   • Heart Disease Mother      Social History     Social History   • Marital status:      Spouse name: N/A   • Number of children: N/A   • Years of education: N/A     Occupational History   • Not on file.     Social History Main Topics   • Smoking status: Former Smoker     Types: Cigarettes   • Smokeless tobacco: Never Used      Comment: Quit 2003   • Alcohol use 1.2 oz/week     2 Glasses of wine per week      Comment: 2/day   • Drug use: No   • Sexual activity: Not on file     Other Topics Concern   • Not on file     Social History Narrative   • No narrative on file     No Known Allergies  Outpatient Encounter Prescriptions as of 4/23/2019   Medication Sig Dispense Refill   • lisinopril (PRINIVIL, ZESTRIL) 40 MG tablet lisinopril 40 mg tablet   Take 1 tablet every day by oral route.     • lisinopril (PRINIVIL, ZESTRIL) 40 MG tablet Take 1 Tab by mouth every morning. 90 Tab 3   • rosuvastatin (CRESTOR) 40 MG tablet Take 1 Tab by mouth every day. 90 Tab 3   • clopidogrel (PLAVIX) 75 MG Tab Take 1 Tab by mouth every day. 90 Tab 3   • metoprolol SR (TOPROL XL) 50 MG TABLET SR 24 HR Take 1 Tab by mouth 2 Times a Day. 180 Tab 3   • Cholecalciferol (VITAMIN D PO) Take 5,000 Int'l Units by mouth every day.     • [DISCONTINUED] metoprolol SR (TOPROL XL) 50 MG TABLET SR 24 HR Take 1 Tab by mouth every day. 90 Tab 1   • gabapentin (NEURONTIN) 300 MG Cap TAKE 2 CAPSULE(S) TWICE A DAY BY ORAL ROUTE FOR 30 DAYS.  5   • [DISCONTINUED] rosuvastatin (CRESTOR) 40 MG tablet Take 1 Tab by mouth every day. 90 Tab 3   • [DISCONTINUED] clopidogrel (PLAVIX) 75 MG Tab Take 1 Tab by mouth every day. 90 Tab 3   • [DISCONTINUED] lisinopril (PRINIVIL, ZESTRIL) 40 MG tablet Take 40 mg by mouth every morning.       No facility-administered encounter medications on file as of 4/23/2019.      Review of Systems   Constitutional: Negative for " "chills, fever, malaise/fatigue and weight loss.   HENT: Negative for ear discharge, ear pain, hearing loss and nosebleeds.    Eyes: Negative for blurred vision, double vision, pain and discharge.   Respiratory: Negative for cough and shortness of breath.    Cardiovascular: Negative for chest pain, palpitations, orthopnea, claudication, leg swelling and PND.   Gastrointestinal: Negative for abdominal pain, blood in stool, melena, nausea and vomiting.   Genitourinary: Negative for dysuria and hematuria.   Musculoskeletal: Negative for falls, joint pain and myalgias.   Skin: Negative for itching and rash.   Neurological: Negative for dizziness, sensory change, speech change, loss of consciousness and headaches.   Endo/Heme/Allergies: Negative for environmental allergies. Does not bruise/bleed easily.   Psychiatric/Behavioral: Negative for depression, hallucinations and suicidal ideas.        Objective:   /90 (BP Location: Left arm, Patient Position: Sitting, BP Cuff Size: Adult)   Pulse 72   Ht 1.803 m (5' 11\")   Wt 94.3 kg (208 lb)   SpO2 98%   BMI 29.01 kg/m²      Physical Exam   Constitutional: He is oriented to person, place, and time. No distress.   HENT:   Head: Normocephalic and atraumatic.   Right Ear: External ear normal.   Left Ear: External ear normal.   Eyes: Right eye exhibits no discharge. Left eye exhibits no discharge.   Neck: No JVD present. No thyromegaly present.   Cardiovascular: Normal rate, regular rhythm, normal heart sounds and intact distal pulses.  Exam reveals no gallop and no friction rub.    No murmur heard.  Pulmonary/Chest: Breath sounds normal. No respiratory distress.   Abdominal: Bowel sounds are normal. He exhibits no distension. There is no tenderness.   Musculoskeletal: He exhibits no edema or tenderness.   Neurological: He is alert and oriented to person, place, and time. No cranial nerve deficit.   Skin: Skin is warm and dry. He is not diaphoretic.   Psychiatric: He has " a normal mood and affect. His behavior is normal.   Nursing note and vitals reviewed.      Assessment:     1. History of heart artery stent prx/mid LAD with NADIRA in 2004  rosuvastatin (CRESTOR) 40 MG tablet    clopidogrel (PLAVIX) 75 MG Tab    EKG   2. HTN (hypertension), malignant  lisinopril (PRINIVIL, ZESTRIL) 40 MG tablet    metoprolol SR (TOPROL XL) 50 MG TABLET SR 24 HR    EKG   3. Mixed hyperlipidemia     4. Coronary artery disease involving native coronary artery of native heart without angina pectoris  rosuvastatin (CRESTOR) 40 MG tablet    clopidogrel (PLAVIX) 75 MG Tab    EKG   5. Stenosis of right carotid artery     6. High risk medication use     7. Preop cardiovascular exam  EKG   8. Dyslipidemia         Medical Decision Making:  Today's Assessment / Status / Plan:   Blood pressure is high. Will increase Toprol XL to 50 mg bid. Continue Lisinopril 40 mg po daily.    For his stented coronary artery and CAD, will continue Rosuvastatin, ASA, Plavix.    Patient remains low to moderate cardiovascular risk for her intended surgery of foot.    I will see patient back in clinic with lab tests and studies results in 6 months.    I thank you Dr. Marion for referring patient to our Cardiology Clinic today.          Sebastian Marion M.D.  [address] [place] [id number]  VIA Facsimile: [phone]

## 2019-04-23 NOTE — LETTER
April 23, 2019        Tuan Bernard was seen in my cardiology clinic today.  Patient will have foot surgery soon.   Patient is not in acute coronary syndrome presentation, is not having any active TIA or stroke symptoms, as not having decompensated heart failure, is not symptomatic in terms of presyncope pain or syncope. No evidence of significant valvular disease.    I explained to patient that further cardiac testing or procedures will not lower his overall cardiovascular risk for the surgery.  Patient remains low to moderate cardiovascular risk for her intended surgery of foot.    Ok to stop dual-antiplatelet therapy 7 days before procedure / surgery and resume as soon as possible after surgery / procedure.          Ted West MD.   [facility].  [phone]  [place], [place].

## 2019-04-25 DIAGNOSIS — I10 HTN (HYPERTENSION), MALIGNANT: ICD-10-CM

## 2019-04-25 DIAGNOSIS — E78.5 DYSLIPIDEMIA: ICD-10-CM

## 2019-04-25 DIAGNOSIS — Z95.5 HISTORY OF HEART ARTERY STENT: ICD-10-CM

## 2019-04-25 DIAGNOSIS — Z79.899 HIGH RISK MEDICATION USE: ICD-10-CM

## 2019-04-25 DIAGNOSIS — I25.10 CORONARY ARTERY DISEASE INVOLVING NATIVE CORONARY ARTERY OF NATIVE HEART WITHOUT ANGINA PECTORIS: ICD-10-CM

## 2019-08-01 ENCOUNTER — HOSPITAL ENCOUNTER (OUTPATIENT)
Dept: RADIOLOGY | Facility: MEDICAL CENTER | Age: 69
End: 2019-08-01
Attending: SURGERY
Payer: MEDICARE

## 2019-08-01 DIAGNOSIS — I65.22 OCCLUSION OF LEFT CAROTID ARTERY: ICD-10-CM

## 2019-08-01 PROCEDURE — 93880 EXTRACRANIAL BILAT STUDY: CPT

## 2020-08-20 ENCOUNTER — HOSPITAL ENCOUNTER (OUTPATIENT)
Dept: RADIOLOGY | Facility: MEDICAL CENTER | Age: 70
End: 2020-08-20
Attending: SURGERY
Payer: MEDICARE

## 2020-08-20 DIAGNOSIS — I65.22 OCCLUSION OF LEFT CAROTID ARTERY: ICD-10-CM

## 2020-08-20 PROCEDURE — 93880 EXTRACRANIAL BILAT STUDY: CPT

## 2021-05-06 ENCOUNTER — HOSPITAL ENCOUNTER (OUTPATIENT)
Dept: RADIOLOGY | Facility: MEDICAL CENTER | Age: 71
End: 2021-05-06
Attending: SURGERY
Payer: MEDICARE

## 2021-05-06 DIAGNOSIS — I65.29 STENOSIS OF CAROTID ARTERY, UNSPECIFIED LATERALITY: ICD-10-CM

## 2021-05-06 PROCEDURE — 93880 EXTRACRANIAL BILAT STUDY: CPT

## 2021-05-10 PROCEDURE — 93880 EXTRACRANIAL BILAT STUDY: CPT | Mod: 26 | Performed by: SURGERY

## (undated) DEVICE — KIT SURGIFLO W/OUT THROMBIN - (6EA/CA)

## (undated) DEVICE — DERMABOND ADVANCED - (12EA/BX)

## (undated) DEVICE — MIDAS LUBRICATOR DIFFUSER PACK (4EA/CA)

## (undated) DEVICE — SUCTION INSTRUMENT YANKAUER BULBOUS TIP W/O VENT (50EA/CA)

## (undated) DEVICE — CANISTER SUCTION 3000ML MECHANICAL FILTER AUTO SHUTOFF MEDI-VAC NONSTERILE LF DISP  (40EA/CA)

## (undated) DEVICE — TUBE E-T HI-LO CUFF 8.0MM (10EA/PK)

## (undated) DEVICE — SLEEVE, VASO, THIGH, MED

## (undated) DEVICE — GLOVE BIOGEL SZ 8 SURGICAL PF LTX - (50PR/BX 4BX/CA)

## (undated) DEVICE — DRAPE SURG STERI-DRAPE 7X11OD - (40EA/CA)

## (undated) DEVICE — LACTATED RINGERS INJ 1000 ML - (14EA/CA 60CA/PF)

## (undated) DEVICE — SUTURE 2-0 VICRYL PLUS CT-1 - 8 X 18 INCH(12/BX)

## (undated) DEVICE — SPONGE GAUZESTER 4 X 4 4PLY - (128PK/CA)

## (undated) DEVICE — CHLORAPREP 26 ML APPLICATOR - ORANGE TINT(25/CA)

## (undated) DEVICE — SET EXTENSION WITH 2 PORTS (48EA/CA) ***PART #2C8610 IS A SUBSTITUTE*****

## (undated) DEVICE — GLOVE BIOGEL PI INDICATOR SZ 7.0 SURGICAL PF LF - (50/BX 4BX/CA)

## (undated) DEVICE — DEVICE MONOPOLAR RF PEAK PLASMABLADE 3.0S

## (undated) DEVICE — SUTURE 1 VICRYL PLUS CT-1 - 18 INCH (12/BX)

## (undated) DEVICE — HEADREST PRONEVIEW LARGE - (10/CA)

## (undated) DEVICE — ARMREST CRADLE FOAM - (2PR/PK 12PR/CA)

## (undated) DEVICE — ELECTRODE DUAL RETURN W/ CORD - (50/PK)

## (undated) DEVICE — KIT ROOM DECONTAMINATION

## (undated) DEVICE — GLOVE BIOGEL INDICATOR SZ 8 SURGICAL PF LTX - (50/BX 4BX/CA)

## (undated) DEVICE — KIT ANESTHESIA W/CIRCUIT & 3/LT BAG W/FILTER (20EA/CA)

## (undated) DEVICE — MASK ANESTHESIA ADULT  - (100/CA)

## (undated) DEVICE — DRAPE MICROSCOPE X-LONG (10EA/CA)

## (undated) DEVICE — SENSOR SPO2 NEO LNCS ADHESIVE (20/BX) SEE USER NOTES

## (undated) DEVICE — PACK NEURO - (2EA/CA)

## (undated) DEVICE — ELECTRODE 850 FOAM ADHESIVE - HYDROGEL RADIOTRNSPRNT (50/PK)

## (undated) DEVICE — TOOL DISSECT MATCH HEAD

## (undated) DEVICE — SODIUM CHL IRRIGATION 0.9% 1000ML (12EA/CA)

## (undated) DEVICE — NEPTUNE 4 PORT MANIFOLD - (20/PK)

## (undated) DEVICE — LACTATED RINGERS INJ. 500 ML - (24EA/CA)

## (undated) DEVICE — SUTURE GENERAL

## (undated) DEVICE — GOWN WARMING STANDARD FLEX - (30/CA)

## (undated) DEVICE — DRAPE LAPAROTOMY T SHEET - (12EA/CA)

## (undated) DEVICE — BLADE SURGICAL CLIPPER - (50EA/CA)

## (undated) DEVICE — PROTECTOR ULNA NERVE - (36PR/CA)

## (undated) DEVICE — SET LEADWIRE 5 LEAD BEDSIDE DISPOSABLE ECG (1SET OF 5/EA)

## (undated) DEVICE — TUBING CLEARLINK DUO-VENT - C-FLO (48EA/CA)

## (undated) DEVICE — TUBING C&T SET FLYING LEADS DRAIN TUBING (10EA/BX)

## (undated) DEVICE — SPHERE NAVIGATION STEALTH (5EA/TY 12TY/PK)

## (undated) DEVICE — DRAPE LARGE 3 QUARTER - (20/CA)

## (undated) DEVICE — SEALER BIPOLAR 2.3 AQUAMANTYS

## (undated) DEVICE — KIT EVACUATER 3 SPRING PVC LF 1/8 DRAIN SIZE (10EA/CA)"